# Patient Record
Sex: MALE | Race: WHITE | NOT HISPANIC OR LATINO | Employment: OTHER | URBAN - METROPOLITAN AREA
[De-identification: names, ages, dates, MRNs, and addresses within clinical notes are randomized per-mention and may not be internally consistent; named-entity substitution may affect disease eponyms.]

---

## 2020-08-06 ENCOUNTER — OFFICE VISIT CONVERTED (OUTPATIENT)
Dept: PULMONOLOGY | Facility: CLINIC | Age: 80
End: 2020-08-06
Attending: INTERNAL MEDICINE

## 2020-09-22 ENCOUNTER — OFFICE VISIT CONVERTED (OUTPATIENT)
Dept: CARDIOLOGY | Facility: CLINIC | Age: 80
End: 2020-09-22
Attending: INTERNAL MEDICINE

## 2020-09-22 ENCOUNTER — CONVERSION ENCOUNTER (OUTPATIENT)
Dept: OTHER | Facility: HOSPITAL | Age: 80
End: 2020-09-22

## 2020-10-13 ENCOUNTER — OFFICE VISIT CONVERTED (OUTPATIENT)
Dept: CARDIOLOGY | Facility: CLINIC | Age: 80
End: 2020-10-13
Attending: INTERNAL MEDICINE

## 2020-10-26 ENCOUNTER — OFFICE VISIT CONVERTED (OUTPATIENT)
Dept: PULMONOLOGY | Facility: CLINIC | Age: 80
End: 2020-10-26
Attending: INTERNAL MEDICINE

## 2021-01-19 ENCOUNTER — OFFICE VISIT CONVERTED (OUTPATIENT)
Dept: CARDIOLOGY | Facility: CLINIC | Age: 81
End: 2021-01-19
Attending: INTERNAL MEDICINE

## 2021-05-13 NOTE — PROGRESS NOTES
Progress Note      Patient Name: Rubin Ratliff   Patient ID: [id number]   Sex: Male   YOB: 1940    Primary Care Provider: Asif Chavez MD   Referring Provider: Asif Chavez MD    Visit Date: September 22, 2020    Provider: Devante Greer MD   Location: [facility]   Location Address: [address] [place] [address]  [phone]          History Of Present Illness  REFERRING CARE PROVIDER: Asif Chavez MD   Dear Asif, I saw Rubin Ratliff in the office today for a follow-up evaluation and management of severe non-ischemic cardiomyopathy, hypertension, with dual-chamber AICD. I last saw Mr. Ratliff in February 2019. He is re-establishing care in my practice. Over the last several months, he has had worsening shortness of breath. He was hospitalized once with a congestive heart failure exacerbation and subsequently recently underwent right-sided thoracentesis twice. He is compliant with his medications. Over the past few weeks, he has felt much better. He is short of breath with exertion but back to his stable baseline at this point.   PAST MEDICAL HISTORY: includes severe non-ischemic cardiomyopathy; hypertension; dual-chamber AICD device.   PSYCHOSOCIAL HISTORY: No history of mood changes or depression. He rarely drinks alcohol and never used tobacco.   CURRENT MEDICATIONS: include Advair inhaler; Albuterol inhaler; Bystolic 2.5 mg daily; calcium with Vitamin D; steroid cream; Fosinopril 5 mg b.i.d.; Lasix 40 mg p.r.n. daily for swelling; Omeprazole 20 mg daily; Hydrocodone as needed; Prolia injections; Tamsulosin 0.4 mg twice a day; Ventolin inhaler; Aldactone 25 mg daily. The dosage and frequency of the medications were reviewed with the patient.       Review of Systems  · Cardiovascular  o Admits  o : palpitations, ankle swelling occasionally, shortness of breath with exertion  o Denies  o : chest pain or angina pectoris  · Respiratory  o Admits  o :  "occasional wheezing  o Denies  o : chronic or frequent cough, asthma      Vitals  Date Time BP Position Site L\R Cuff Size HR RR TEMP (F) WT  HT  BMI kg/m2 BSA m2 O2 Sat HC       09/22/2020 11:17 /72 Sitting    79 - R   142lbs 0oz 5'  10\" 20.37 1.78           Physical Examination  · Respiratory  o Auscultation of Lungs  o : Clear to auscultation bilaterally. No crackles or rhonchi.  · Cardiovascular  o Heart  o : S1, S2 is normally heard. No S3. No murmur, rubs, or gallops.  · Gastrointestinal  o Abdominal Examination  o : Soft, nontender, nondistended. No free fluid. Bowel sounds heard in all four quadrants.  · Extremities  o Extremities  o : Warm and well perfused. No pitting pedal edema. Distal pulses present.     His recent rhythm strip from his hospitalization showed sinus rhythm with ventricular-paced rhythm.    Echocardiogram done July 22nd showed severe left ventricular systolic dysfunction, ejection fraction 20% with severe global hypokinesis, trace mitral regurgitation, mild tricuspid regurgitation, evidence of diastolic dysfunction, moderate left-sided pleural effusion.    Laboratory studies were reviewed:  Recent BUN 18, creatinine 1.1; CBC normal.    I reviewed his most recent device records from his previous cardiology group.  His device is functioning normally.  He is requiring more right ventricular pacing over time.  As of one year ago, he was pacing 51% of the time in the right ventricle.           Assessment     1.  Severe non-ischemic cardiomyopathy with chronic systolic heart failure - NYHA Class III symptomatology.       The patient was recently hospitalized over the summer for congestive heart failure and required        thoracentesis for volume overload.  He is clinically stable currently on stable medical therapy.    2.  Dual-chamber AICD - His device is functioning normally.  It has been noted over time that he is requiring        more right ventricular pacing.  This may be adversely " affecting his heart failure and symptomatology.    3.  Hypertension - Stable.  4.  COPD - Stable.         Plan     1.  I agree with his current medical therapy.  The patient will be brought back next month for an in-office        device interrogation, so we can see his pacing burden and whether we can eliminate RV pacing.  If we        cannot, given his symptomatology and degree of RV pacing, he may benefit significantly from upgrading his        device to a biventricular/CRT system.  I have discussed this with him today, and we will need an updated        device interrogation to see if that is advisable.  He is agreeable with this plan.  I will see him back in the        office next month at the time of his device interrogation.  2.  We will assume care of his remote monitoring.    It is a privilege to assist in his care.   Please let me know if you have any questions regarding his case.    Sincerely,         MATHIEU Greer M.D.  luly/chyna           This note was transcribed by Abby Rodriguez.  dmd/cbd  The above service was transcribed by Abyb Rodriguez, and I attest to the accuracy of the note.  CBD.             Electronically Signed by: Abby Rodriguez-, -Author on September 25, 2020 12:10:41 PM  Electronically Co-signed by: Devante Greer MD -Reviewer on September 25, 2020 12:40:47 PM

## 2021-05-13 NOTE — PROGRESS NOTES
Progress Note      Patient Name: Rubin Ratliff   Patient ID: [id number]   Sex: Male   YOB: 1940    Primary Care Provider: Asif Chavez MD   Referring Provider: Asif Chavez MD    Visit Date: October 13, 2020    Provider: Devante Greer MD   [facility]   Location Address: [address] [place] [address]  [phone]          Chief Complaint  · Followup from ER visit, ICD shock.      History Of Present Illness  REFERRING CARE PROVIDER: Asif Chavez MD   Rubin Ratliff is an 79 year old male who presents for followup evaluation and management of severe nonischemic cardiomyopathy, dual-chamber ICD, and recent ICD shock for ventricular tachycardia/ventricular fibrillation. Since last visit on 09/22/2020, Mr. Ratliff has been doing relatively well. He had no significant symptoms leading up to recently having an ICD shock. This occurred last Wednesday. He was out running an errand, and while driving, he suddenly felt a jolt in his chest. He did not lose consciousness. He did not have presyncope or chest pain. Review of the electrograms recorded by his device showed initial VT into the ventricular fibrillation zone and successful rescue by his ICD. He otherwise has been compliant with his medications.   PAST MEDICAL HISTORY: Dual-chamber AICD device; Hypertension; Nonischemic cardiomyopathy.   PSYCHOSOCIAL HISTORY: Current tobacco use. Denies alcohol use.   CURRENT MEDICATIONS: Advair inhaler; albuterol inhaler; calcium +D; steroid cream; fosinopril 5 mg b.i.d.; Lasix 40 mg p.r.n.; metoprolol 50 mg daily; omeprazole 20 mg daily; hydrocodone p.r.n.; Prolia injection q. 6 months; tamsulosin 0.4 mg daily; Ventolin inhaler; Aldactone 25 mg daily.       Review of Systems  · Cardiovascular  o Denies  o : palpitations (fast, fluttering, or skipping beats), swelling (feet, ankles, hands), shortness of breath while walking or lying flat, chest pain or angina  "pectoris   · Respiratory  o Denies  o : chronic or frequent cough      Vitals  Date Time BP Position Site L\R Cuff Size HR RR TEMP (F) WT  HT  BMI kg/m2 BSA m2 O2 Sat FR L/min FiO2 HC       10/13/2020 10:32 AM 86/54 Sitting    68 - R   140lbs 8oz 5'  10\" 20.16 1.77             Physical Examination  · Respiratory  o Auscultation of Lungs  o : Clear to auscultation bilaterally. No crackles or rhonchi.  · Cardiovascular  o Heart  o : S1, S2 normally heard. No S3. No murmur, rubs, or gallops.  · Gastrointestinal  o Abdominal Examination  o : Soft, nontender, nondistended. No free fluid. Bowel sounds heard in all four quadrants.  · Extremities  o Extremities  o : Warm and well perfused. No pitting pedal edema. Distal pulses present.  · Labs  o Labs  o : His laboratory studies at the emergency room showed normal chemistry and normal CBC.   · Device Interrogation  o Device Interrogation  o : I reviewed his ICD interrogation and electrograms and remote monitoring.           Assessment     1.  Recent VT/VF episode, suddenly, successfully rescued by his ICD.  2.  Severe nonischemic cardiomyopathy, currently compensated.    3.  Hypertension with initially instituting Toprol XL.  It seems to have improved clinically since his Bystolic wore        off.       Plan     1.  No driving for 3 months based on his ICD shock.  2.  Continue Toprol XL which replaced his Bystolic.  3.  Follow up in 3 months with device interrogation at that time.  The patient is agreeable with this plan.      It is a pleasure to assist in his care.  Please let me know if you have any questions regarding his case.      AMTHIEU Greer MD  CBD:vm             Electronically Signed by: Valorie Schaefer-, Other -Author on October 15, 2020 10:09:04 AM  Electronically Co-signed by: Devante Greer MD -Reviewer on October 16, 2020 07:56:41 AM  "

## 2021-05-14 VITALS
BODY MASS INDEX: 20.11 KG/M2 | DIASTOLIC BLOOD PRESSURE: 54 MMHG | WEIGHT: 140.5 LBS | HEIGHT: 70 IN | HEART RATE: 68 BPM | SYSTOLIC BLOOD PRESSURE: 86 MMHG

## 2021-05-14 VITALS
BODY MASS INDEX: 20.49 KG/M2 | SYSTOLIC BLOOD PRESSURE: 99 MMHG | DIASTOLIC BLOOD PRESSURE: 63 MMHG | WEIGHT: 143.12 LBS | HEIGHT: 70 IN | HEART RATE: 60 BPM

## 2021-05-14 VITALS
BODY MASS INDEX: 20.33 KG/M2 | SYSTOLIC BLOOD PRESSURE: 113 MMHG | HEIGHT: 70 IN | HEART RATE: 79 BPM | DIASTOLIC BLOOD PRESSURE: 72 MMHG | WEIGHT: 142 LBS

## 2021-05-14 NOTE — PROGRESS NOTES
"   Progress Note      Patient Name: Rubin Ratliff   Patient ID: [id number]   Sex: Male   YOB: 1940    Primary Care Provider: Asif Chavez MD   Referring Provider: Asif Chavez MD    Visit Date: January 19, 2021    Provider: Devante Greer MD   Location: [facility]   Location Address: [address] [place] [address]  [phone]          History Of Present Illness  REFERRING CARE PROVIDER: Asif Chavez MD   Rubin Ratliff is a 80 year old male who I saw in the office today for followup evaluation and management of severe nonischemic cardiomyopathy, chronic systolic heart failure, NYHA class II, dual-chamber AICD, and history of VT with AICD shock in October 2020. These problems have been stable since the last visit. Mr. Ratliff is doing relatively well. He is compliant with his medications. He has no further ICD shocks, no chest pain or palpitations.   PAST MEDICAL HISTORY: Dual-chamber AICD device; hypertension; nonischemic cardiomyopathy.   PSYCHOSOCIAL HISTORY: Denies tobacco and alcohol use.   CURRENT MEDICATIONS: Fosinopril sodium 10 mg half tab b.i.d.; furosemide 40 mg one to two tablets b.i.d.; metoprolol succinate ER 50 mg daily; fluticasone-salmeterol inhaled b.i.d.; omeprazole 20 mg daily. The dosage and frequency of the medications were reviewed with the patient.      ALLERGIES: No known drug allergies.       Review of Systems  · Cardiovascular  o Denies  o : palpitations (fast, fluttering, or skipping beats), swelling (feet, ankles, hands), shortness of breath while walking or lying flat, chest pain or angina pectoris   · Respiratory  o Denies  o : chronic or frequent cough      Vitals  Date Time BP Position Site L\R Cuff Size HR RR TEMP (F) WT  HT  BMI kg/m2 BSA m2 O2 Sat FR L/min FiO2 HC       01/19/2021 11:51 AM 99/63 Sitting    60 - R   143lbs 2oz 5'  10\" 20.54 1.79             Physical Examination  · Constitutional  o Appearance  o : Thin " white male, pleasant, in no acute distress.  · Respiratory  o Auscultation of Lungs  o : Clear to auscultation bilaterally. No crackles or rhonchi.  · Cardiovascular  o Heart  o : Soft basal systolic murmur.  · Gastrointestinal  o Abdominal Examination  o : Soft, nontender, nondistended. No free fluid. Bowel sounds heard in all four quadrants.  · Extremities  o Extremities  o : Warm and well perfused. No pitting pedal edema. Distal pulses present.  · Labs  o Labs  o : Laboratory studies reviewed.     Device Interrogation:  His ICD was interrogated today.  I reviewed the intracardiac tracings.  Battery life is 3-1/2 years.  Lead systems are normal.  He is 14% atrial paced and 32% ventricular paced.  He has brief nonsustained ventricular episodes but no further therapies have been delivered.      Primary care records reviewed.              Assessment     1.  Severe nonischemic cardiomyopathy with chronic systolic heart failure, NYHA class II - Clinically stable,        currently compensated.   2.  History of ventricular tachycardia in October 2020 with successful ICD rescue - He has had no further         arrhythmia episodes and that is the only rescue therapy he has received since device implant.   3.  History of dual-chamber primary prevention AICD - His device is functioning normally today.          Plan     1.  Continue his current guideline directed medical therapy.  His volume status is stable on current dose of        Lasix.   2.  Continue remote AICD monitoring.    3.  I have cleared him to drive again since he has had no further arrhythmias of significance in 3 months.   4.  Otherwise routine followup will be arranged unless problems arise in the meantime.                Electronically Signed by: Iesha Castellanos-, Other -Author on January 24, 2021 11:14:03 AM  Electronically Co-signed by: Devante Greer MD -Reviewer on February 1, 2021 09:08:06 AM

## 2021-05-28 VITALS
RESPIRATION RATE: 16 BRPM | TEMPERATURE: 98.2 F | WEIGHT: 147.12 LBS | HEIGHT: 70 IN | DIASTOLIC BLOOD PRESSURE: 60 MMHG | OXYGEN SATURATION: 97 % | HEART RATE: 94 BPM | BODY MASS INDEX: 21.06 KG/M2 | SYSTOLIC BLOOD PRESSURE: 98 MMHG

## 2021-05-28 VITALS
BODY MASS INDEX: 20.49 KG/M2 | WEIGHT: 143.12 LBS | DIASTOLIC BLOOD PRESSURE: 61 MMHG | OXYGEN SATURATION: 98 % | HEIGHT: 70 IN | HEART RATE: 69 BPM | RESPIRATION RATE: 18 BRPM | SYSTOLIC BLOOD PRESSURE: 100 MMHG | TEMPERATURE: 98.3 F

## 2021-05-28 NOTE — PROGRESS NOTES
Patient: ANIYA JACKSON     Acct: [id number]     Report: #[id number]  UNIT #: [id number]     : 1940    Encounter Date:2020  PRIMARY CARE: ZACHARY KRAUSE  ***Signed***  --------------------------------------------------------------------------------------------------------------------  Chief Complaint      Encounter Date      Aug 6, 2020            Primary Care Provider      ZACHARY KRAUSE            Referring Provider      ZACHARY KRAUSE            Patient Complaint      Patient is complaining of      new patient/pleura effusion            VITALS      Height 5 ft 10 in / 177.8 cm      Weight 147 lbs 2 oz / 66.108386 kg      BSA 1.83 m2      BMI 21.1 kg/m2      Temperature 98.2 F / 36.78 C - Oral      Pulse 94      Respirations 16      Blood Pressure 98/60 Sitting, Right Arm      Pulse Oximetry 97%, room air            HPI      The patient is an 79 year old  male with known cardiomyopathy and     systolic heart failure with EF of 20% here for evaluation for pleural effusion.     The patient lives in [place] and is a patient at [facility]. The patient was at     [facility] for worsening shortness of breath and had large right pleural     effusion with two liters drained.  A week later, he had another liter and a half    drained.  I do have that analysis and it is all transudative and lymphocyte     predominant fluid with negative cytology.  The patient was also grossly volume     overloaded and was diuresed aggressively during his hospitalization. The patient    is now on Lasix and spironolactone and had his other cardiac medications     adjusted.  He states that his last tap was one week ago  where about one liter     was removed from his chest.  He feels much better and feels like he has not had     recurrent symptoms as previously.  He denies any dyspnea, cough, wheeze,     headaches, chest pain or hemoptysis. He has intermittent leg swelling, but     denies any orthopnea or paroxysmal  nocturnal dyspnea.  He is weighing himself     everyday and is maintaining euvolemia.  He does state that if he gets up to 145     pounds he takes an extra dose of his Lasix.  He does not feel he needs to have     his fluid drained at this time.  He is a never smoker, but does report a history    of COPD.  It also sounds like he more than likely has asthma. he takes Advair     and does not use rescue inhaler.  He is able to perform ADLs without difficulty.     Denies any swollen glands or lymph nodes of the head and neck.            I have personally reviewed the review of systems, past family, social, surgical     and medical histories and I agree with the findings.            ROS      Constitutional:  Complains of: Fatigue; Denies: Fever, Weight gain, Weight loss,    Chills, Insomnia, Other      Respiratory/Breathing:  Complains of: Shortness of air, Wheezing, Cough; Denies:    Hemoptysis, Pleuritic pain, Other      Endocrine:  Denies: Polydipsia, Polyuria, Heat/cold intolerance, Diabetes, Other      Eyes:  Denies: Blurred vision, Vision Changes, Other      Ears, nose, mouth, throat:  Denies: Mouth lesions, Thrush, Throat pain,     Hoarseness, Allergies/Hay Fever, Post Nasal Drip, Headaches, Recent Head Injury,    Nose Bleeding, Neck Stiffness, Thyroid Mass, Hearing Loss, Ear Fullness, Dry     Mouth, Nasal or Sinus Pain, Dry Lips, Nasal discharge, Nasal congestion, Other      Cardiovascular:  Denies: Palpitations, Syncope, Claudication, Chest Pain, Wake     up Gasping for air, Leg Swelling, Irregular Heart Rate, Cyanosis, Dyspnea on     Exertion, Other      Gastrointestinal:  Denies: Nausea, Constipation, Diarrhea, Abdominal pain,     Vomiting, Difficulty Swallowing, Reflux/Heartburn, Dysphagia, Jaundice, Bloatin    g, Melena, Bloody stools, Other      Genitourinary:  Denies: Urinary frequency, Incontinence, Hematuria, Urgency,     Nocturia, Dysuria, Testicular problems, Other      Musculoskeletal:  Denies: Joint  Pain, Joint Stiffness, Joint Swelling, Myalgias,    Other      Hematologic/lymphatic:  DENIES: Lymphadenopathy, Bruising, Bleeding tendencies,     Other      Neurological:  Denies: Headache, Numbness, Weakness, Seizures, Other      Psychiatric:  Denies: Anxiety, Appropriate Effect, Depression, Other      Sleep:  No: Excessive daytime sleep, Morning Headache?, Snoring, Insomnia?, Stop    breathing at sleep?, Other      Integumentary:  Denies: Rash, Dry skin, Skin Warm to Touch, Other      Immunologic/Allergic:  Denies: Latex allergy, Seasonal allergies, Asthma,     Urticaria, Eczema, Other      Immunization status:  No: Up to date            FAMILY/SOCIAL/MEDICAL HX      Surgical History:  Yes: Back Surgery, Carotid Stenosis, Hernia Surgery,     Orthopedic Surgery (back), Vascular Surgery (pacemaker ); No: AAA Repair,     Abdominal Surgery, Adenoids, Angioplasty, Appendectomy, Bladder Surgery, Bowel     Surgery, Breast Surgery, CABG, Cholecystectomy, Ear Surgery, Eye Surgery, Head     Surgery, Kidney Surgery, Nose Surgery, Oral Surgery, Prostatectomy, Rectal Surge    ry, Spinal Surgery, Testicular Surgery, Throat Surgery, Tonsils, Valve     Replacement, Other Surgeries      Heart - Family Hx:  Father, Sister      Cancer/Type - Family Hx:  Mother, Brother      Is Father Still Living?:  No      Is Mother Still Living?:  No       Family History:  Yes      Social History:  No Tobacco Use, No Alcohol Use, No Recreational Drug use      Smoking status:  Never smoker      Anticoagulation Therapy:  No      Antibiotic Prophylaxis:  No      Medical History:  Yes: Arthritis, Asthma, Chronic Bronchitis/COPD, Miscellaneous    Medical/oth (heart failure ); No: Alcoholism, Allergies, Anemia, Atrial     Fibrillation, Blood Disease, Broken Bones, Cataracts, Chemical Dependency,     Chemotherapy/Cancer, Emphysema, Chronic Liver Disease, Colon Trouble, Colitis,     Diverticulitis, Congestive Heart Failu, Deafness or Ringing Ears,  Convulsions,     Depression, Anxiety, Bipolar Disorder, PTSD, Diabetes, Epilepsy, Seizures,     Forgetfullness, Glaucoma, Gall Stones, Gout, Head Injury, Heart Attack, Heart     Murmur, GERD, Hemorrhoids/Rectal Prob, Hepatitis, Hiatal Hernia, High Blood     Pressure, High Cholesterol, HIV (Do not ask - volu, Jaundice, Kidney or Bladder     Disease, Kidney Stones, Migrane Headaches, Mitral Valve Prolapse, Night sweats,     Phlebitis, Psychiatric Care, Reflux Disease, Rheumatic Fever, Sexually     Transmitted Dis, Shortness Of Breath, Sinus Trouble, Skin Disease/Psoriais/Ecz,     Stroke, Thyroid Problem, Tuberculosis or Pos TB Te      Psychiatric History      none            PREVENTION      Hx Influenza Vaccination:  Yes      Date Influenza Vaccine Given:  Nov 1, 2019      Influenza Vaccine Declined:  No      2 or More Falls in Past Year?:  No      Fall Past Year with Injury?:  No      Hx Pneumococcal Vaccination:  Yes      Encouraged to follow-up with:  PCP regarding preventative exams.      Chart initiated by      karma cowart ma            ALLERGIES/MEDICATIONS      Allergies:        Coded Allergies:             Boniva (Verified  Allergy, 8/6/20)           Fosamax (Verified  Allergy, 8/6/20)           Levofloxacin (Verified  Allergy, 8/6/20)           Penicillins (Verified  Allergy, 8/6/20)      Medications    Last Reconciled on 8/7/20 08:27 by SOLOMON ORO MD      Tamsulosin HCL (Tamsulosin HCL) 0.4 Mg Capsule      0.4 MG PO QDAY, #30 CAP 0 Refills         Reported         8/6/20       Spironolactone (Spironolactone*) 25 Mg Tablet      25 MG PO QDAY, #30 TAB 0 Refills         Reported         8/6/20       Omeprazole (Omeprazole*) 20 Mg Tablet.      20 MG PO QDAY, #30 CAP 0 Refills         Reported         8/6/20       Nebivolol HCl (BYSTOLIC) 2.5 Mg Tablet      2.5 MG PO QDAY, #30 TAB         Reported         8/6/20       Furosemide* (Lasix*) 40 Mg Tablet      40 MG PO QDAY, #30 TAB 0 Refills          Reported         8/6/20       Fosinopril Sodium (FOSINOPRIL SODIUM) 10 Mg Tablet      5 MG PO HS, #30 TAB 0 Refills         Reported         8/6/20       Fluticasone/Salmeterol 230/21 (Advair /21 MCG) 12 Gm Hfa.aer.ad      2 PUFF INH RTBID, INH         Reported         8/6/20       Denosumab (Prolia) 60 Mg/1 Ml Syringe      60 MG SUBQ Q180D, #1 SYRINGE         Reported         8/6/20       Cholecalciferol (Vitamin D3*) 2,000 U Tablet      2000 UNITS PO QDAY, #30 TAB 0 Refills         Reported         8/6/20       NEB-Albuterol Sulf (Albuterol Sulfate) 5 Mg/1 Ml Solution      2.5 MG INH RTTID, #2 BOTTLE 0 Refills         Reported         8/6/20       MDI-Albuterol (Proair HFA) 8.5 Gm Hfa.aer.ad      2 PUFFS INH RTTID, #1 MDI 0 Refills         Reported         8/6/20      Current Medications      Current Medications Reviewed 8/6/20            EXAM      Vital Signs Reviewed.      General:  Elderly male in no apparent distress.        HEENT: PERRL, EOMI.  OP, nares clear, no sinus tenderness.      Neck: Supple, no JVD, no thyromegaly.      Lymph: No axillary, cervical, supraclavicular lymphadenopathy noted bilaterally.      Chest: Good aeration, diminished right base, but clear to auscultation     bilaterally, dull to percussion right base, no work of breathing noted.      CV: RRR, systolic ejection murmur appreciated.        Abd: Soft, NT, ND, +BS, no HSM.      EXT: No clubbing, no cyanosis, no edema, no joint tenderness.        Neuro:  A  Skin: No rashes or lesions.      Vtials      Vitals:             Height 5 ft 10 in / 177.8 cm           Weight 147 lbs 2 oz / 66.198616 kg           BSA 1.83 m2           BMI 21.1 kg/m2           Temperature 98.2 F / 36.78 C - Oral           Pulse 94           Respirations 16           Blood Pressure 98/60 Sitting, Right Arm           Pulse Oximetry 97%, room air            REVIEW      Results Reviewed      PCCS Results Reviewed?:  Yes Prev Lab Results, Yes Prev Radiology  Results, Yes     [facility]      Lab Results      I reviewed office notes from referring provider. I reviewed medical records from    [facility] including multiple chest x-rays showing large right greater     than left bilateral pleural effusions.  I reviewed a thoracentesis note from     radiology where they drained 2 liters followed 1 liter.  Pleural fluid analysis     yielded a transudative fluid that was lymphocyte predominant with negative     cultures and cytology.            Assessment      Pleural effusion - J90            Notes      New Medications      * MDI-Albuterol (Proair HFA) 8.5 GM HFA.AER.AD: 2 PUFFS INH RTTID #1      * NEB-Albuterol Sulf (Albuterol Sulfate) 5 MG/1 ML SOLUTION: 2.5 MG INH RTTID #2         Instructions: DIAGNOSIS CODE REQUIRED PRIOR TO PRESCRIBING.      * Cholecalciferol (Vitamin D3*) 2,000 U TABLET: 2,000 UNITS PO QDAY #30      * DENOSUMAB (Prolia) 60 MG/1 ML SYRINGE: 60 MG SUBQ Q180D #1         Instructions: Give every 6 months.      * Fluticasone/Salmeterol 230/21 (Advair /21 MCG) 12 GM HFA.AER.AD: 2 PUFF      INH RTBID      * FOSINOPRIL SODIUM 10 MG TABLET: 5 MG PO HS #30      * Furosemide* (Lasix*) 40 MG TABLET: 40 MG PO QDAY #30      * Nebivolol HCl (BYSTOLIC) 2.5 MG TABLET: 2.5 MG PO QDAY #30      * Omeprazole (Omeprazole*) 20 MG TABLET.DR: 20 MG PO QDAY #30      * Spironolactone (Spironolactone*) 25 MG TABLET: 25 MG PO QDAY #30      * Tamsulosin HCL 0.4 MG CAPSULE: 0.4 MG PO QDAY #30      New Diagnostics      * Chest 2 View, 3 Months         Dx: Pleural effusion - J90      IMPRESSION:      1.  Right greater than left bilateral pleural effusions. Transudative secondary     to congestive heart failure.      2. Chronic compensated systolic congestive heart failure with EF 20A%.      3. Intermittent leg edema.      4. Dyspnea, resolved.      5. Orthopnea, resolved.      6. Never smoker.      7. Asthma without exacerbation.  Asthma control test score  is 26, doing great     with Advair with no symptoms.               PLAN:      1.  Discussed with the patient that his pleural effusions were secondary to     congestive heart failure and no other occult pathology or anything ominous is     causing these effusions. The patient appears to be euvolemic and needs to     continue all of his diuretics and cardiac medications at this time as     prescribed.      2. Continue checking daily weights and maintain a fluid and salt restricted     diet.      3.  On exam he does have a small pleural effusion which I will send him for an     x-ray at his next follow up visit. I informed him he will likely always carry a     little bit of fluid on him given his age and EF.  I would not consider draining     this unless he has worsening symptoms. I discussed with the patient if at any     point he has worsening dyspnea, orthopnea or PND, he is notify myself or his PCP    and he can have a thoracentesis done at [facility] or he can travel to     [facility] and I will be more than happy to perform a thoracentesis here.      4. Up-to-date with flu, Prevnar and Pneumovax.      5.  Continue Advair with albuterol as needed.      6. Follow up with me in three months.            Patient Education            Patient Education:        Pleural Effusion      Thoracentesis            Electronically signed by SOLOMON ORO  08/10/2020 08:01       Disclaimer: Converted document may not contain table formatting or lab diagrams. Please see [facility] for the authenticated document.
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
## 2021-07-20 ENCOUNTER — OFFICE VISIT (OUTPATIENT)
Dept: CARDIOLOGY | Facility: CLINIC | Age: 81
End: 2021-07-20

## 2021-07-20 VITALS
SYSTOLIC BLOOD PRESSURE: 96 MMHG | HEART RATE: 60 BPM | BODY MASS INDEX: 21.18 KG/M2 | DIASTOLIC BLOOD PRESSURE: 64 MMHG | HEIGHT: 69 IN | WEIGHT: 143 LBS

## 2021-07-20 DIAGNOSIS — I50.22 CHRONIC SYSTOLIC CONGESTIVE HEART FAILURE (HCC): ICD-10-CM

## 2021-07-20 DIAGNOSIS — I42.0 DILATED CARDIOMYOPATHY (HCC): Primary | ICD-10-CM

## 2021-07-20 DIAGNOSIS — Z95.810 ICD (IMPLANTABLE CARDIOVERTER-DEFIBRILLATOR), DUAL, IN SITU: ICD-10-CM

## 2021-07-20 DIAGNOSIS — I47.20 VT (VENTRICULAR TACHYCARDIA) (HCC): ICD-10-CM

## 2021-07-20 PROCEDURE — 99214 OFFICE O/P EST MOD 30 MIN: CPT | Performed by: INTERNAL MEDICINE

## 2021-07-20 RX ORDER — FOSINOPRIL SODIUM 10 MG/1
10 TABLET ORAL DAILY
COMMUNITY

## 2021-07-20 RX ORDER — OMEPRAZOLE 20 MG/1
20 CAPSULE, DELAYED RELEASE ORAL DAILY
COMMUNITY

## 2021-07-20 RX ORDER — PHENOL 1.4 %
600 AEROSOL, SPRAY (ML) MUCOUS MEMBRANE DAILY
COMMUNITY

## 2021-07-20 RX ORDER — TAMSULOSIN HYDROCHLORIDE 0.4 MG/1
0.4 CAPSULE ORAL DAILY
COMMUNITY

## 2021-07-20 RX ORDER — ALBUTEROL SULFATE 90 UG/1
2 AEROSOL, METERED RESPIRATORY (INHALATION)
COMMUNITY

## 2021-07-20 RX ORDER — FUROSEMIDE 40 MG/1
40 TABLET ORAL DAILY
COMMUNITY
Start: 2021-06-09

## 2021-07-20 RX ORDER — METOPROLOL SUCCINATE 50 MG/1
50 TABLET, EXTENDED RELEASE ORAL DAILY
COMMUNITY
Start: 2021-07-03

## 2021-07-20 RX ORDER — SPIRONOLACTONE 25 MG/1
25 TABLET ORAL DAILY
COMMUNITY
Start: 2021-07-04

## 2021-07-20 NOTE — PROGRESS NOTES
Chief Complaint  Follow-up (no complaints)    Subjective            Rubin Ratliff presents to [facility] CARDIOLOGY  History of Present Illness    Rubin is here for follow-up evaluation and management of nonischemic cardiomyopathy, NYHA class II chronic systolic heart failure, history of VT with AICD shock October 2020 and dual-chamber AICD.  Since last visit Mr. Ratliff is doing relatively well.  He stays relatively active.  He denies excessive shortness of breath or chest pain.  No AICD shocks or palpitations.    PMH  Past Medical History:   Diagnosis Date   • Hypertension          SURGICALHX  Past Surgical History:   Procedure Laterality Date   • CARDIAC CATHETERIZATION     • INSERT / REPLACE / REMOVE PACEMAKER          SOC  Social History     Socioeconomic History   • Marital status:      Spouse name: Not on file   • Number of children: Not on file   • Years of education: Not on file   • Highest education level: Not on file   Tobacco Use   • Smoking status: Never Smoker   • Smokeless tobacco: Never Used   Vaping Use   • Vaping Use: Never used   Substance and Sexual Activity   • Alcohol use: Never   • Drug use: Never   • Sexual activity: Defer         FAMHX  Family History   Problem Relation Age of Onset   • Heart disease Father    • Heart failure Father           ALLERGY  Allergies   Allergen Reactions   • Penicillins Hives        MEDSCURRENT    Current Outpatient Medications:   •  albuterol sulfate  (90 Base) MCG/ACT inhaler, Inhale 2 puffs., Disp: , Rfl:   •  calcium carbonate (OS-FACUNDO) 600 MG tablet, Take 600 mg by mouth Daily., Disp: , Rfl:   •  denosumab (PROLIA) 60 MG/ML solution prefilled syringe syringe, Inject 60 mg under the skin into the appropriate area as directed., Disp: , Rfl:   •  fluticasone-salmeterol (Advair Diskus) 100-50 MCG/DOSE DISKUS, Inhale 2 puffs Daily., Disp: , Rfl:   •  fosinopril (MONOPRIL) 10 MG tablet, Take 10 mg by mouth Daily., Disp: , Rfl:  "  •  furosemide (LASIX) 40 MG tablet, Take 40 mg by mouth Daily., Disp: , Rfl:   •  metoprolol succinate XL (TOPROL-XL) 50 MG 24 hr tablet, Take 50 mg by mouth Daily., Disp: , Rfl:   •  omeprazole (priLOSEC) 20 MG capsule, Take 20 mg by mouth Daily., Disp: , Rfl:   •  spironolactone (ALDACTONE) 25 MG tablet, Take 25 mg by mouth Daily., Disp: , Rfl:   •  tamsulosin (FLOMAX) 0.4 MG capsule 24 hr capsule, Take 0.4 mg by mouth Daily., Disp: , Rfl:       Review of Systems   Constitutional: Negative.   Cardiovascular: Negative.    Respiratory: Negative.         Objective     BP 96/64 (Patient Position: Sitting)   Pulse 60   Ht 175.3 cm (69\")   Wt 64.9 kg (143 lb)   BMI 21.12 kg/m²       General Appearance:   · well developed  · well nourished  HENT:   · oropharynx moist  · lips not cyanotic  Neck:  · thyroid not enlarged  · supple  Respiratory:  · no respiratory distress  · normal breath sounds  · no rales  Cardiovascular:  · no jugular venous distention  · regular rhythm  · apical impulse normal  · S1 normal, S2 normal  · no S3, no S4   · no murmur  · no rub, no thrill  · carotid pulses normal; no bruit  · pedal pulses normal  · lower extremity edema: none    Musculoskeletal:  · no clubbing of fingers.   · normocephalic, head atraumatic  Skin:   · warm, dry  Psychiatric:  · judgement and insight appropriate  · normal mood and affect      Result Review :             Data reviewed: Primary care records reviewed by me     Procedures     Dual-chamber ICD remote monitoring data reviewed, as of January normal device function battery life 3-1/2 years, 32% ventricular pacing, no significant arrhythmias               Assessment and Plan        ASSESSMENT:  Encounter Diagnoses   Name Primary?   • Dilated cardiomyopathy (CMS/HCC) Yes   • Chronic systolic congestive heart failure (CMS/HCC)    • ICD (implantable cardioverter-defibrillator), dual, in situ    • VT (ventricular tachycardia) (CMS/HCC)          PLAN:    1Jael Conklin is " clinically stable, no recurrent significant VT.  He is on appropriate medical therapy which is limited to some degree by his chronic relatively low blood pressure.  2.  AICD is functioning normally, he has had ventricular pacing to a moderate degree over the years, but clinically is stable.  If he has worsening heart failure and significantly higher ventricular pacing burden upgrade to a CRT device can be considered in the future which we have discussed  3.  Otherwise follow-up in 6 months with a device check as well          Patient was given instructions and counseling regarding his condition or for health maintenance advice. Please see specific information pulled into the AVS if appropriate.             JAMES Greer MD  7/20/2021    11:01 EDT

## 2022-02-15 ENCOUNTER — OFFICE VISIT (OUTPATIENT)
Dept: CARDIOLOGY | Facility: CLINIC | Age: 82
End: 2022-02-15

## 2022-02-15 ENCOUNTER — CLINICAL SUPPORT NO REQUIREMENTS (OUTPATIENT)
Dept: CARDIOLOGY | Facility: CLINIC | Age: 82
End: 2022-02-15

## 2022-02-15 VITALS
DIASTOLIC BLOOD PRESSURE: 69 MMHG | BODY MASS INDEX: 21.19 KG/M2 | WEIGHT: 148 LBS | HEIGHT: 70 IN | HEART RATE: 76 BPM | SYSTOLIC BLOOD PRESSURE: 104 MMHG

## 2022-02-15 DIAGNOSIS — I42.0 DILATED CARDIOMYOPATHY: Primary | ICD-10-CM

## 2022-02-15 DIAGNOSIS — Z95.810 ICD (IMPLANTABLE CARDIOVERTER-DEFIBRILLATOR), DUAL, IN SITU: ICD-10-CM

## 2022-02-15 DIAGNOSIS — I47.20 VT (VENTRICULAR TACHYCARDIA): ICD-10-CM

## 2022-02-15 DIAGNOSIS — I50.22 CHRONIC SYSTOLIC CONGESTIVE HEART FAILURE: ICD-10-CM

## 2022-02-15 PROCEDURE — 93283 PRGRMG EVAL IMPLANTABLE DFB: CPT | Performed by: INTERNAL MEDICINE

## 2022-02-15 PROCEDURE — 99214 OFFICE O/P EST MOD 30 MIN: CPT | Performed by: INTERNAL MEDICINE

## 2022-02-15 RX ORDER — METOLAZONE 2.5 MG/1
TABLET ORAL AS NEEDED
COMMUNITY
Start: 2022-01-31

## 2022-02-15 NOTE — PROGRESS NOTES
Normal Dual ICD Device Interrogation and Device Testing.  Normal evaluation of device function and lead measurements.  No optimization was needed of parameters or maximization of device longevity.  Patient is on automated Home Remote Monitoring.  Remaining battery is 2.5 years, he has had 7 NS-VT short episodes.

## 2022-02-15 NOTE — PROGRESS NOTES
Chief Complaint  Congestive Heart Failure, Cardiomyopathy, and Presence of Pacemaker    Subjective            Rubin Ratliff presents to [facility] CARDIOLOGY  Congestive Heart Failure  Pertinent negatives include no chest pain or palpitations.   Cardiomyopathy  Pertinent negatives include no chest pain.       Rubin is here for follow-up evaluation and management of nonischemic cardiomyopathy, NYHA class II chronic systolic heart failure, history of VT with AICD shock October 2020 and dual-chamber AICD.  Since last visit Mr. Ratliff is doing relatively well. He denies excessive shortness of breath. He has been monitoring his daily weight which has been stable, only once over the past couple of months has he needed to take a as needed metolazone dose for a 1 pound weight gain. He denies significant chest pain. No palpitations or subjective tachycardia.    PMH  Past Medical History:   Diagnosis Date   • CHF (congestive heart failure) (HCC)    • Hypertension          SURGICALHX  Past Surgical History:   Procedure Laterality Date   • CARDIAC CATHETERIZATION     • INSERT / REPLACE / REMOVE PACEMAKER          SOC  Social History     Socioeconomic History   • Marital status:    Tobacco Use   • Smoking status: Never Smoker   • Smokeless tobacco: Never Used   Vaping Use   • Vaping Use: Never used   Substance and Sexual Activity   • Alcohol use: Never   • Drug use: Never   • Sexual activity: Defer         FAMHX  Family History   Problem Relation Age of Onset   • Heart disease Father    • Heart failure Father           ALLERGY  Allergies   Allergen Reactions   • Penicillins Hives        MEDSCURRENT    Current Outpatient Medications:   •  albuterol sulfate  (90 Base) MCG/ACT inhaler, Inhale 2 puffs., Disp: , Rfl:   •  calcium carbonate (OS-FACUNDO) 600 MG tablet, Take 600 mg by mouth Daily., Disp: , Rfl:   •  denosumab (PROLIA) 60 MG/ML solution prefilled syringe syringe, Inject 60 mg under the  "skin into the appropriate area as directed. Every 6 months, Disp: , Rfl:   •  fluticasone-salmeterol (Advair Diskus) 100-50 MCG/DOSE DISKUS, Inhale 2 puffs Daily., Disp: , Rfl:   •  fosinopril (MONOPRIL) 10 MG tablet, Take 10 mg by mouth Daily., Disp: , Rfl:   •  furosemide (LASIX) 40 MG tablet, Take 40 mg by mouth Daily., Disp: , Rfl:   •  metOLazone (ZAROXOLYN) 2.5 MG tablet, As Needed., Disp: , Rfl:   •  metoprolol succinate XL (TOPROL-XL) 50 MG 24 hr tablet, Take 50 mg by mouth Daily., Disp: , Rfl:   •  omeprazole (priLOSEC) 20 MG capsule, Take 20 mg by mouth Daily., Disp: , Rfl:   •  spironolactone (ALDACTONE) 25 MG tablet, Take 25 mg by mouth Daily., Disp: , Rfl:   •  tamsulosin (FLOMAX) 0.4 MG capsule 24 hr capsule, Take 0.4 mg by mouth Daily., Disp: , Rfl:       Review of Systems   Constitutional: Negative.   Cardiovascular: Positive for dyspnea on exertion. Negative for chest pain, irregular heartbeat and palpitations.   Respiratory: Negative.         Objective     /69   Pulse 76   Ht 177.8 cm (70\")   Wt 67.1 kg (148 lb)   BMI 21.24 kg/m²       General Appearance:   · well developed  · well nourished  HENT:   · oropharynx moist  · lips not cyanotic  Neck:  · thyroid not enlarged  · supple  Respiratory:  · no respiratory distress  · normal breath sounds  · no rales  Cardiovascular:  · no jugular venous distention  · regular rhythm  · apical impulse normal  · S1 normal, S2 normal  · no S3, no S4   · no murmur  · no rub, no thrill  · carotid pulses normal; no bruit  · pedal pulses normal  · lower extremity edema: none    Musculoskeletal:  · no clubbing of fingers.   · normocephalic, head atraumatic  Skin:   · warm, dry  Psychiatric:  · judgement and insight appropriate  · normal mood and affect      Result Review :             Data reviewed: Primary care records reviewed, pacemaker/ICD remote monitoring reviewed. Laboratory studies reviewed, recent CBC normal, creatinine 1.5     Procedures     ICD " interrogated today, normal device function, 19% atrial pacing, 35% ventricular pacing, no significant sustained arrhythmias, battery life 2-1/2 years               Assessment and Plan        ASSESSMENT:  Encounter Diagnoses   Name Primary?   • Dilated cardiomyopathy (HCC) Yes   • Chronic systolic congestive heart failure (HCC)    • VT (ventricular tachycardia) (HCC)    • ICD (implantable cardioverter-defibrillator), dual, in situ          PLAN:    1. Dilated cardiomyopathy/chronic systolic heart failure clinically stable, continue current guideline directed medical therapy. Diuretic regimen appears appropriate.  2.  AICD is functioning normally, he has had ventricular pacing to a moderate degree over the years, which has been stable, but clinically is stable.  If he has worsening heart failure and significantly higher ventricular pacing burden upgrade to a CRT device can be considered in the future which we have discussed  3. No significant recurrent VT, continue current medical regimen    Follow-up in about 6 months          Patient was given instructions and counseling regarding his condition or for health maintenance advice. Please see specific information pulled into the AVS if appropriate.             JAMES Greer MD  2/15/2022    12:05 EST

## 2022-05-05 ENCOUNTER — TELEPHONE (OUTPATIENT)
Dept: CARDIOLOGY | Facility: CLINIC | Age: 82
End: 2022-05-05

## 2022-05-05 NOTE — TELEPHONE ENCOUNTER
You will need to contact the [employer] rep and see if they will replace it. [employer] can assist    CBD

## 2022-05-05 NOTE — TELEPHONE ENCOUNTER
Let Alice pts daughter know that [employer] will be sending out a blood pressure monitor out in the mail to pts home. Per [employer]

## 2022-05-05 NOTE — TELEPHONE ENCOUNTER
PTS DAUGHTER CALLED REQUESTING A NEW BP MACHINE, STATES HE HAS BEEN HAVING TROUBLE OUT OF HIS FOR SEVERAL MONTHS ALMOST LIKE THE BATTERIES WONT GO IN THE MACHINE WELL ANYMORE. PLEASE ADVISE. -PTS DAUGHTER CALLED BACK LATER AND STATED [employer] IS THE COMPANY THAT SENT ALL OF THE EQUIPMENT AND THE BP MACHINE SAYS (AND) ON IT.

## 2022-08-16 ENCOUNTER — OFFICE VISIT (OUTPATIENT)
Dept: CARDIOLOGY | Facility: CLINIC | Age: 82
End: 2022-08-16

## 2022-08-16 VITALS
HEART RATE: 76 BPM | SYSTOLIC BLOOD PRESSURE: 106 MMHG | HEIGHT: 70 IN | WEIGHT: 145.2 LBS | DIASTOLIC BLOOD PRESSURE: 74 MMHG | BODY MASS INDEX: 20.79 KG/M2

## 2022-08-16 DIAGNOSIS — I42.0 DILATED CARDIOMYOPATHY: Primary | ICD-10-CM

## 2022-08-16 DIAGNOSIS — I50.22 CHRONIC SYSTOLIC CONGESTIVE HEART FAILURE: ICD-10-CM

## 2022-08-16 DIAGNOSIS — I47.20 VT (VENTRICULAR TACHYCARDIA): ICD-10-CM

## 2022-08-16 DIAGNOSIS — Z95.810 ICD (IMPLANTABLE CARDIOVERTER-DEFIBRILLATOR), DUAL, IN SITU: ICD-10-CM

## 2022-08-16 PROCEDURE — 99214 OFFICE O/P EST MOD 30 MIN: CPT | Performed by: INTERNAL MEDICINE

## 2022-08-16 NOTE — PROGRESS NOTES
Chief Complaint  Cardiomyopathy    Subjective            Rubin Ratliff presents to [facility] CARDIOLOGY  History of Present Illness    Mr. Ratliff is here for follow-up evaluation management of dilated cardiomyopathy with chronic systolic heart failure, ventricular tachycardia, and dual-chamber ICD.  Since last visit he is doing relatively well.  He has had some right hip pain but otherwise denies chest pain palpitations or excessive shortness of breath.  He is compliant with his medications.    PMH  Past Medical History:   Diagnosis Date   • CHF (congestive heart failure) (HCC)    • Hypertension          SURGICALHX  Past Surgical History:   Procedure Laterality Date   • CARDIAC CATHETERIZATION     • INSERT / REPLACE / REMOVE PACEMAKER          SOC  Social History     Socioeconomic History   • Marital status:    Tobacco Use   • Smoking status: Never Smoker   • Smokeless tobacco: Never Used   Vaping Use   • Vaping Use: Never used   Substance and Sexual Activity   • Alcohol use: Never   • Drug use: Never   • Sexual activity: Defer         FAMHX  Family History   Problem Relation Age of Onset   • Heart disease Father    • Heart failure Father           ALLERGY  Allergies   Allergen Reactions   • Penicillins Hives        MEDSCURRENT    Current Outpatient Medications:   •  albuterol sulfate  (90 Base) MCG/ACT inhaler, Inhale 2 puffs., Disp: , Rfl:   •  calcium carbonate (OS-FACUNDO) 600 MG tablet, Take 600 mg by mouth Daily., Disp: , Rfl:   •  denosumab (PROLIA) 60 MG/ML solution prefilled syringe syringe, Inject 60 mg under the skin into the appropriate area as directed. Every 6 months, Disp: , Rfl:   •  fluticasone-salmeterol (ADVAIR) 100-50 MCG/DOSE DISKUS, Inhale 2 puffs Daily., Disp: , Rfl:   •  fosinopril (MONOPRIL) 10 MG tablet, Take 10 mg by mouth Daily., Disp: , Rfl:   •  furosemide (LASIX) 40 MG tablet, Take 40 mg by mouth Daily., Disp: , Rfl:   •  metOLazone (ZAROXOLYN) 2.5 MG  "tablet, As Needed., Disp: , Rfl:   •  metoprolol succinate XL (TOPROL-XL) 50 MG 24 hr tablet, Take 50 mg by mouth Daily., Disp: , Rfl:   •  omeprazole (priLOSEC) 20 MG capsule, Take 20 mg by mouth Daily., Disp: , Rfl:   •  spironolactone (ALDACTONE) 25 MG tablet, Take 25 mg by mouth Daily., Disp: , Rfl:   •  tamsulosin (FLOMAX) 0.4 MG capsule 24 hr capsule, Take 0.4 mg by mouth Daily., Disp: , Rfl:       Review of Systems   Cardiovascular: Negative for chest pain, dyspnea on exertion and palpitations.   Musculoskeletal: Positive for joint pain.        Objective     /74   Pulse 76   Ht 177.8 cm (70\")   Wt 65.9 kg (145 lb 3.2 oz)   BMI 20.83 kg/m²       General Appearance:   · well developed  · well nourished  HENT:   · oropharynx moist  · lips not cyanotic  Neck:  · thyroid not enlarged  · supple  Respiratory:  · no respiratory distress  · normal breath sounds  · no rales  Cardiovascular:  · no jugular venous distention  · regular rhythm  · apical impulse normal  · S1 normal, S2 normal  · no S3, no S4   · no murmur  · no rub, no thrill  · carotid pulses normal; no bruit  · pedal pulses normal  · lower extremity edema: none    Musculoskeletal:  · no clubbing of fingers.   · normocephalic, head atraumatic  Skin:   · warm, dry  Psychiatric:  · judgement and insight appropriate  · normal mood and affect      Result Review :             Data reviewed: Primary care records reviewed, laboratory studies reviewed, remote ICD monitoring reviewed.  14% right atrial pacing, 49% right ventricular pacing, adequate battery life     Procedures               Assessment and Plan        ASSESSMENT:  Encounter Diagnoses   Name Primary?   • Dilated cardiomyopathy (HCC) Yes   • Chronic systolic congestive heart failure (HCC)    • VT (ventricular tachycardia) (HCC)    • ICD (implantable cardioverter-defibrillator), dual, in situ          PLAN:    1.  Dilated cardiomyopathy with chronic systolic congestive heart failure, " clinically stable, continue current medical therapy.  2.  Ventricular tachycardia, stable, no significant recurrence on remote ICD monitoring.  3.  Dual-chamber ICD, normal device function with adequate battery life, continue remote monitoring    Follow-up in 6 months          Patient was given instructions and counseling regarding his condition or for health maintenance advice. Please see specific information pulled into the AVS if appropriate.             JAMES Greer MD  8/16/2022    11:40 EDT

## 2023-02-21 ENCOUNTER — OFFICE VISIT (OUTPATIENT)
Dept: CARDIOLOGY | Facility: CLINIC | Age: 83
End: 2023-02-21
Payer: MEDICARE

## 2023-02-21 ENCOUNTER — CLINICAL SUPPORT NO REQUIREMENTS (OUTPATIENT)
Dept: CARDIOLOGY | Facility: CLINIC | Age: 83
End: 2023-02-21
Payer: MEDICARE

## 2023-02-21 VITALS
HEIGHT: 70 IN | HEART RATE: 67 BPM | SYSTOLIC BLOOD PRESSURE: 110 MMHG | BODY MASS INDEX: 20.76 KG/M2 | WEIGHT: 145 LBS | DIASTOLIC BLOOD PRESSURE: 71 MMHG

## 2023-02-21 DIAGNOSIS — I50.22 CHRONIC SYSTOLIC CONGESTIVE HEART FAILURE: ICD-10-CM

## 2023-02-21 DIAGNOSIS — I47.20 VT (VENTRICULAR TACHYCARDIA): ICD-10-CM

## 2023-02-21 DIAGNOSIS — Z95.810 ICD (IMPLANTABLE CARDIOVERTER-DEFIBRILLATOR), DUAL, IN SITU: ICD-10-CM

## 2023-02-21 DIAGNOSIS — I42.0 DILATED CARDIOMYOPATHY: ICD-10-CM

## 2023-02-21 DIAGNOSIS — I42.0 DILATED CARDIOMYOPATHY: Primary | ICD-10-CM

## 2023-02-21 DIAGNOSIS — Z95.810 ICD (IMPLANTABLE CARDIOVERTER-DEFIBRILLATOR), DUAL, IN SITU: Primary | ICD-10-CM

## 2023-02-21 PROCEDURE — 99214 OFFICE O/P EST MOD 30 MIN: CPT | Performed by: INTERNAL MEDICINE

## 2023-02-21 PROCEDURE — 93283 PRGRMG EVAL IMPLANTABLE DFB: CPT | Performed by: INTERNAL MEDICINE

## 2023-02-21 NOTE — PROGRESS NOTES
Normal Dual Chamber ICD Device Interrogation and Device Testing.  Normal evaluation of device function and lead measurements.  No optimization was needed of parameters or maximization of device longevity.  Patient is on automated Home Remote Monitoring.  Remaining battery is 1 1/2 years remaining.

## 2023-02-21 NOTE — PROGRESS NOTES
Chief Complaint  Cardiomyopathy    Subjective            Rubin Ratliff presents to [facility] CARDIOLOGY  Cardiomyopathy  Pertinent negatives include no chest pain.       Mr. Ratliff is here for follow-up evaluation management of dilated cardiomyopathy with chronic systolic heart failure, ventricular tachycardia, and dual-chamber ICD.  Since last visit he is doing relatively well.  He has had some right hip pain but otherwise denies chest pain palpitations or excessive shortness of breath.  He is compliant with his medications.     PMH  Past Medical History:   Diagnosis Date   • CHF (congestive heart failure) (HCC)    • Hypertension          SURGICALHX  Past Surgical History:   Procedure Laterality Date   • CARDIAC CATHETERIZATION     • INSERT / REPLACE / REMOVE PACEMAKER          SOC  Social History     Socioeconomic History   • Marital status:    Tobacco Use   • Smoking status: Never   • Smokeless tobacco: Never   Vaping Use   • Vaping Use: Never used   Substance and Sexual Activity   • Alcohol use: Never   • Drug use: Never   • Sexual activity: Defer         FAMHX  Family History   Problem Relation Age of Onset   • Heart disease Father    • Heart failure Father           ALLERGY  Allergies   Allergen Reactions   • Penicillins Hives        MEDSCURRENT    Current Outpatient Medications:   •  albuterol sulfate  (90 Base) MCG/ACT inhaler, Inhale 2 puffs., Disp: , Rfl:   •  calcium carbonate (OS-FACUNDO) 600 MG tablet, Take 600 mg by mouth Daily., Disp: , Rfl:   •  denosumab (PROLIA) 60 MG/ML solution prefilled syringe syringe, Inject 60 mg under the skin into the appropriate area as directed. Every 6 months, Disp: , Rfl:   •  fluticasone-salmeterol (ADVAIR) 100-50 MCG/DOSE DISKUS, Inhale 2 puffs Daily., Disp: , Rfl:   •  fosinopril (MONOPRIL) 10 MG tablet, Take 10 mg by mouth Daily., Disp: , Rfl:   •  furosemide (LASIX) 40 MG tablet, Take 40 mg by mouth Daily., Disp: , Rfl:   •   "metOLazone (ZAROXOLYN) 2.5 MG tablet, As Needed., Disp: , Rfl:   •  metoprolol succinate XL (TOPROL-XL) 50 MG 24 hr tablet, Take 50 mg by mouth Daily., Disp: , Rfl:   •  omeprazole (priLOSEC) 20 MG capsule, Take 20 mg by mouth Daily., Disp: , Rfl:   •  spironolactone (ALDACTONE) 25 MG tablet, Take 25 mg by mouth Daily., Disp: , Rfl:   •  tamsulosin (FLOMAX) 0.4 MG capsule 24 hr capsule, Take 0.4 mg by mouth Daily., Disp: , Rfl:       Review of Systems   Cardiovascular: Negative for chest pain, dyspnea on exertion and palpitations.   Musculoskeletal: Positive for joint pain.        Objective     /71   Pulse 67   Ht 177.8 cm (70\")   Wt 65.8 kg (145 lb)   BMI 20.81 kg/m²       General Appearance:   · well developed  · well nourished  HENT:   · oropharynx moist  · lips not cyanotic  Neck:  · thyroid not enlarged  · supple  Respiratory:  · no respiratory distress  · normal breath sounds  · no rales  Cardiovascular:  · no jugular venous distention  · regular rhythm  · apical impulse normal  · S1 normal, S2 normal  · no S3, no S4   · no murmur  · no rub, no thrill  · carotid pulses normal; no bruit  · pedal pulses normal  · lower extremity edema: none    Musculoskeletal:  · no clubbing of fingers.   · normocephalic, head atraumatic  Skin:   · warm, dry  Psychiatric:  · judgement and insight appropriate  · normal mood and affect      Result Review :             Data reviewed: ICD interrogation today shows normal device function, 1-1/2 years battery life, very short nonsustained episodes, normal lead system function     Procedures               Assessment and Plan        ASSESSMENT:  Encounter Diagnoses   Name Primary?   • Dilated cardiomyopathy (HCC) Yes   • Chronic systolic congestive heart failure (HCC)    • VT (ventricular tachycardia)    • ICD (implantable cardioverter-defibrillator), dual, in situ          PLAN:    1.  Dilated cardiomyopathy with chronic systolic congestive heart failure, clinically stable, " continue current medical therapy.  2.  Ventricular tachycardia, stable, no significant recurrence on remote ICD monitoring.  3.  Dual-chamber ICD, normal device function with adequate battery life, continue remote monitoring    Follow-up in 6 months          Patient was given instructions and counseling regarding his condition or for health maintenance advice. Please see specific information pulled into the AVS if appropriate.             JAMES Greer MD  2/21/2023    10:58 EST

## 2023-04-04 ENCOUNTER — TELEPHONE (OUTPATIENT)
Dept: CARDIOLOGY | Facility: CLINIC | Age: 83
End: 2023-04-04
Payer: MEDICARE

## 2023-04-04 NOTE — TELEPHONE ENCOUNTER
PTS DAUGHTER CALLED ASKING IF IT WAS OKAY FOR HIM TO USE THE SALONPAS (ICY HOT) PATCHES, FOR BACK PAIN. SHE WAS UNSURE DUE TO HIS DEVICE.   PLEASE ADVISE

## 2023-08-22 ENCOUNTER — OFFICE VISIT (OUTPATIENT)
Dept: CARDIOLOGY | Facility: CLINIC | Age: 83
End: 2023-08-22
Payer: MEDICARE

## 2023-08-22 VITALS
SYSTOLIC BLOOD PRESSURE: 105 MMHG | HEIGHT: 70 IN | BODY MASS INDEX: 21.05 KG/M2 | DIASTOLIC BLOOD PRESSURE: 65 MMHG | HEART RATE: 69 BPM | WEIGHT: 147 LBS

## 2023-08-22 DIAGNOSIS — I47.20 VT (VENTRICULAR TACHYCARDIA): ICD-10-CM

## 2023-08-22 DIAGNOSIS — Z95.810 ICD (IMPLANTABLE CARDIOVERTER-DEFIBRILLATOR), DUAL, IN SITU: ICD-10-CM

## 2023-08-22 DIAGNOSIS — I42.0 DILATED CARDIOMYOPATHY: Primary | ICD-10-CM

## 2023-08-22 DIAGNOSIS — I50.22 CHRONIC SYSTOLIC CONGESTIVE HEART FAILURE: ICD-10-CM

## 2023-08-22 PROCEDURE — 1159F MED LIST DOCD IN RCRD: CPT | Performed by: INTERNAL MEDICINE

## 2023-08-22 PROCEDURE — 99214 OFFICE O/P EST MOD 30 MIN: CPT | Performed by: INTERNAL MEDICINE

## 2023-08-22 PROCEDURE — 1160F RVW MEDS BY RX/DR IN RCRD: CPT | Performed by: INTERNAL MEDICINE

## 2023-08-22 RX ORDER — FLUTICASONE PROPIONATE 50 MCG
2 SPRAY, SUSPENSION (ML) NASAL DAILY
COMMUNITY

## 2023-08-22 NOTE — PROGRESS NOTES
"Chief Complaint  Cardiomyopathy    Subjective            Rubin Ratliff presents to [facility] CARDIOLOGY      Mr. Ratliff is here for follow-up evaluation management of dilated cardiomyopathy with chronic systolic heart failure, ventricular tachycardia, and dual-chamber ICD.  Since the last visit he is compliant with his medical therapy.  He does have significant shortness of breath with exertion.  He occasionally takes an extra metolazone based on his weight.  He has relatively poor baseline kidney function.  No palpitations.    PMH  Past Medical History:   Diagnosis Date    CHF (congestive heart failure)     Hypertension     ICD (implantable cardioverter-defibrillator) in place  \" NOT MRI COMPATIBLE\"  E142          SURGICALHX  Past Surgical History:   Procedure Laterality Date    CARDIAC CATHETERIZATION      INSERT / REPLACE / REMOVE PACEMAKER          SOC  Social History     Socioeconomic History    Marital status:    Tobacco Use    Smoking status: Never    Smokeless tobacco: Never   Vaping Use    Vaping Use: Never used   Substance and Sexual Activity    Alcohol use: Never    Drug use: Never    Sexual activity: Defer         FAMHX  Family History   Problem Relation Age of Onset    Heart disease Father     Heart failure Father           ALLERGY  Allergies   Allergen Reactions    Penicillins Hives        MEDSCURRENT    Current Outpatient Medications:     albuterol sulfate  (90 Base) MCG/ACT inhaler, Inhale 2 puffs., Disp: , Rfl:     calcium carbonate (OS-FACUNDO) 600 MG tablet, Take 1 tablet by mouth Daily., Disp: , Rfl:     denosumab (PROLIA) 60 MG/ML solution prefilled syringe syringe, Inject 1 mL under the skin into the appropriate area as directed. Every 6 months, Disp: , Rfl:     fluticasone (FLONASE) 50 MCG/ACT nasal spray, 2 sprays into the nostril(s) as directed by provider Daily., Disp: , Rfl:     fluticasone-salmeterol (ADVAIR) 100-50 MCG/DOSE DISKUS, Inhale 2 puffs " "Daily., Disp: , Rfl:     fosinopril (MONOPRIL) 10 MG tablet, Take 1 tablet by mouth Daily., Disp: , Rfl:     furosemide (LASIX) 40 MG tablet, Take 1 tablet by mouth Daily., Disp: , Rfl:     metOLazone (ZAROXOLYN) 2.5 MG tablet, As Needed., Disp: , Rfl:     metoprolol succinate XL (TOPROL-XL) 50 MG 24 hr tablet, Take 1 tablet by mouth Daily., Disp: , Rfl:     omeprazole (priLOSEC) 20 MG capsule, Take 1 capsule by mouth Daily., Disp: , Rfl:     spironolactone (ALDACTONE) 25 MG tablet, Take 1 tablet by mouth Daily., Disp: , Rfl:     tamsulosin (FLOMAX) 0.4 MG capsule 24 hr capsule, Take 1 capsule by mouth Daily., Disp: , Rfl:       Review of Systems   Cardiovascular:  Positive for dyspnea on exertion. Negative for chest pain and palpitations.   Respiratory:  Positive for shortness of breath.       Objective     /65   Pulse 69   Ht 177.8 cm (70\")   Wt 66.7 kg (147 lb)   BMI 21.09 kg/mý       General Appearance:   well developed  well nourished  HENT:   oropharynx moist  lips not cyanotic  Neck:  thyroid not enlarged  supple  Respiratory:  no respiratory distress  normal breath sounds  no rales  Cardiovascular:  no jugular venous distention  regular rhythm  apical impulse normal  S1 normal, S2 normal  no S3, no S4   no murmur  no rub, no thrill  carotid pulses normal; no bruit  pedal pulses normal  lower extremity edema: none    Musculoskeletal:  no clubbing of fingers.   normocephalic, head atraumatic  Skin:   warm, dry  Psychiatric:  judgement and insight appropriate  normal mood and affect      Result Review :             Data reviewed : ICD remote monitoring shows normal device function, 1.5-year battery life, 46% right ventricular pacing, creatinine 2.0      Procedures               Assessment and Plan        ASSESSMENT:  Encounter Diagnoses   Name Primary?    Dilated cardiomyopathy Yes    ICD (implantable cardioverter-defibrillator), dual, in situ     Chronic systolic congestive heart failure     VT " (ventricular tachycardia)          PLAN:    1.  Dilated cardiomyopathy with chronic systolic congestive heart failure, clinically stable, volume status stable.  Continue guideline directed medical therapy.  He has relatively poor baseline renal function, I do not think he would tolerate Entresto for that reason and also he has morning hypotension often.  He is symptomatic but I think optimized as well as we can currently.  2.  Ventricular tachycardia, stable, no significant recurrence on remote ICD monitoring.  3.  Dual-chamber ICD, normal device function with adequate battery life, continue remote monitoring    Follow-up in 6 months with device check          Patient was given instructions and counseling regarding his condition or for health maintenance advice. Please see specific information pulled into the AVS if appropriate.             JAMES Greer MD  8/22/2023    11:07 EDT

## 2024-02-20 ENCOUNTER — CLINICAL SUPPORT NO REQUIREMENTS (OUTPATIENT)
Dept: CARDIOLOGY | Facility: CLINIC | Age: 84
End: 2024-02-20
Payer: MEDICARE

## 2024-02-20 ENCOUNTER — OFFICE VISIT (OUTPATIENT)
Dept: CARDIOLOGY | Facility: CLINIC | Age: 84
End: 2024-02-20
Payer: MEDICARE

## 2024-02-20 VITALS
HEART RATE: 73 BPM | SYSTOLIC BLOOD PRESSURE: 110 MMHG | HEIGHT: 70 IN | BODY MASS INDEX: 21.9 KG/M2 | DIASTOLIC BLOOD PRESSURE: 69 MMHG | WEIGHT: 153 LBS

## 2024-02-20 DIAGNOSIS — I42.0 DILATED CARDIOMYOPATHY: ICD-10-CM

## 2024-02-20 DIAGNOSIS — I50.22 CHRONIC SYSTOLIC CONGESTIVE HEART FAILURE: Primary | ICD-10-CM

## 2024-02-20 DIAGNOSIS — Z95.810 ICD (IMPLANTABLE CARDIOVERTER-DEFIBRILLATOR), DUAL, IN SITU: ICD-10-CM

## 2024-02-20 DIAGNOSIS — I47.20 VT (VENTRICULAR TACHYCARDIA): ICD-10-CM

## 2024-02-20 PROCEDURE — 93283 PRGRMG EVAL IMPLANTABLE DFB: CPT | Performed by: INTERNAL MEDICINE

## 2024-02-20 NOTE — PROGRESS NOTES
"Chief Complaint  Cardiomyopathy and icd    Subjective            Rubin Ratliff presents to [facility] CARDIOLOGY      Mr. Ratliff is here for follow-up evaluation management of dilated cardiomyopathy with chronic systolic heart failure, ventricular tachycardia, and dual-chamber ICD.  Overall he is doing relatively well.  He does struggle at times with low blood pressure.  His metoprolol dose was reduced previously.  Some of his dizzy spells sound like vertigo as well.    PMH  Past Medical History:   Diagnosis Date    CHF (congestive heart failure)     Hypertension     ICD (implantable cardioverter-defibrillator) in place  \" NOT MRI COMPATIBLE\"  E142          SURGICALHX  Past Surgical History:   Procedure Laterality Date    CARDIAC CATHETERIZATION      INSERT / REPLACE / REMOVE PACEMAKER          SOC  Social History     Socioeconomic History    Marital status:    Tobacco Use    Smoking status: Never    Smokeless tobacco: Never   Vaping Use    Vaping Use: Never used   Substance and Sexual Activity    Alcohol use: Never    Drug use: Never    Sexual activity: Defer         FAMHX  Family History   Problem Relation Age of Onset    Heart disease Father     Heart failure Father           ALLERGY  Allergies   Allergen Reactions    Penicillins Hives        MEDSCURRENT    Current Outpatient Medications:     albuterol sulfate  (90 Base) MCG/ACT inhaler, Inhale 2 puffs., Disp: , Rfl:     calcium carbonate (OS-FACUNDO) 600 MG tablet, Take 1 tablet by mouth Daily., Disp: , Rfl:     denosumab (PROLIA) 60 MG/ML solution prefilled syringe syringe, Inject 1 mL under the skin into the appropriate area as directed. Every 6 months, Disp: , Rfl:     fluticasone (FLONASE) 50 MCG/ACT nasal spray, 2 sprays into the nostril(s) as directed by provider Daily., Disp: , Rfl:     fluticasone-salmeterol (ADVAIR) 100-50 MCG/DOSE DISKUS, Inhale 2 puffs Daily., Disp: , Rfl:     fosinopril (MONOPRIL) 10 MG tablet, Take " "1 tablet by mouth Daily. Pt taking 1/2 tablet  a day, Disp: , Rfl:     furosemide (LASIX) 40 MG tablet, Take 1 tablet by mouth Daily., Disp: , Rfl:     metOLazone (ZAROXOLYN) 2.5 MG tablet, As Needed., Disp: , Rfl:     metoprolol succinate XL (TOPROL-XL) 50 MG 24 hr tablet, Take 0.5 tablets by mouth Daily., Disp: , Rfl:     omeprazole (priLOSEC) 20 MG capsule, Take 1 capsule by mouth Daily., Disp: , Rfl:     spironolactone (ALDACTONE) 25 MG tablet, Take 1 tablet by mouth Daily., Disp: , Rfl:     tamsulosin (FLOMAX) 0.4 MG capsule 24 hr capsule, Take 1 capsule by mouth Daily., Disp: , Rfl:       Review of Systems   Cardiovascular:  Positive for dyspnea on exertion. Negative for chest pain and palpitations.   Respiratory:  Positive for shortness of breath.         Objective     /69   Pulse 73   Ht 177.8 cm (70\")   Wt 69.4 kg (153 lb)   BMI 21.95 kg/m²       General Appearance:   well developed  well nourished  HENT:   oropharynx moist  lips not cyanotic  Neck:  thyroid not enlarged  supple  Respiratory:  no respiratory distress  normal breath sounds  no rales  Cardiovascular:  no jugular venous distention  regular rhythm  apical impulse normal  S1 normal, S2 normal  no S3, no S4   no murmur  no rub, no thrill  carotid pulses normal; no bruit  pedal pulses normal  lower extremity edema: none    Musculoskeletal:  no clubbing of fingers.   normocephalic, head atraumatic  Skin:   warm, dry  Psychiatric:  judgement and insight appropriate  normal mood and affect      Result Review :             Data reviewed : ICD interrogated, normal device function, 47% ventricular pacing, 10-month battery life,      Procedures               Assessment and Plan        ASSESSMENT:  Encounter Diagnoses   Name Primary?    Chronic systolic congestive heart failure Yes    Dilated cardiomyopathy     ICD (implantable cardioverter-defibrillator), dual, in situ          PLAN:    1.  Dilated cardiomyopathy with chronic systolic " congestive heart failure, clinically stable, volume status stable.  Continue guideline directed medical therapy.  His blood pressure has been soft.  I agree with his current dose of medical therapy.  If he continues to have symptomatic hypotension I would get rid of the Monopril in lieu of the beta-blocker.  2.  Ventricular tachycardia, stable, no significant recurrence on remote ICD monitoring.  3.  Dual-chamber ICD, normal device function with adequate battery life, continue remote monitoring    Follow-up in 6 months          Patient was given instructions and counseling regarding his condition or for health maintenance advice. Please see specific information pulled into the AVS if appropriate.             JAMES Greer MD  2/20/2024    11:00 EST

## 2024-02-20 NOTE — PROGRESS NOTES
Normal Dual Chamber ICD Device Interrogation and Device Testing.  Normal evaluation of device function and lead measurements.  No optimization was needed of parameters or maximization of device longevity.  Patient is on automated Home Remote Monitoring.  Remaining battery is 10 months.

## 2024-06-18 ENCOUNTER — OUTSIDE FACILITY SERVICE (OUTPATIENT)
Dept: CARDIOLOGY | Facility: CLINIC | Age: 84
End: 2024-06-18
Payer: MEDICARE

## 2024-07-08 ENCOUNTER — TELEPHONE (OUTPATIENT)
Dept: CARDIOLOGY | Facility: CLINIC | Age: 84
End: 2024-07-08
Payer: MEDICARE

## 2024-07-08 NOTE — TELEPHONE ENCOUNTER
The [facility] received a fax that requires your attention. The document has been indexed to the patient’s chart for your review.      Reason for sending: REC'D FAX ATTENTION TO DR. PEOPLES    Documents Description: PN 7/1/24    Name of Sender: ZACHARY KRAUSE MD    Date Indexed: 7/1/24     Notes (if needed):

## 2024-07-18 ENCOUNTER — TELEPHONE (OUTPATIENT)
Dept: CARDIOLOGY | Facility: CLINIC | Age: 84
End: 2024-07-18
Payer: MEDICARE

## 2024-07-18 ENCOUNTER — PREP FOR SURGERY (OUTPATIENT)
Dept: OTHER | Facility: HOSPITAL | Age: 84
End: 2024-07-18
Payer: MEDICARE

## 2024-07-18 DIAGNOSIS — Z45.02 ICD (IMPLANTABLE CARDIOVERTER-DEFIBRILLATOR) BATTERY DEPLETION: Primary | ICD-10-CM

## 2024-07-18 RX ORDER — SODIUM CHLORIDE 0.9 % (FLUSH) 0.9 %
10 SYRINGE (ML) INJECTION EVERY 12 HOURS SCHEDULED
OUTPATIENT
Start: 2024-07-18

## 2024-07-18 RX ORDER — SODIUM CHLORIDE 9 MG/ML
40 INJECTION, SOLUTION INTRAVENOUS AS NEEDED
OUTPATIENT
Start: 2024-07-18

## 2024-07-18 RX ORDER — SODIUM CHLORIDE 0.9 % (FLUSH) 0.9 %
10 SYRINGE (ML) INJECTION AS NEEDED
OUTPATIENT
Start: 2024-07-18

## 2024-07-18 NOTE — TELEPHONE ENCOUNTER
Mr. Ratliff has a [facility] Dual Chamber ICD that has reached CHRISTINE.    I explained the ICD Battery change out procedure and that it is an outpatient procedure.  He will be contacted once Dr. Greer reviews and has it scheduled.

## 2024-07-23 ENCOUNTER — TELEPHONE (OUTPATIENT)
Dept: CARDIOLOGY | Facility: CLINIC | Age: 84
End: 2024-07-23
Payer: MEDICARE

## 2024-07-23 NOTE — TELEPHONE ENCOUNTER
I spoke to patient and gave an arrival time of 6:30 on 08/01/24 for [employer] dual chamber ICD battery change. Patient was instructed to have a  for the day of the procedure and to arrive at the entrance C registration area. Patient was instructed to continue all medications as usual. Patient was instructed to be NPO after midnight with only sips of water as needed. Patient was instructed to have labs completed on the morning of the procedure. Patient is agreeable with no other questions or concerns.

## 2024-07-31 NOTE — PRE-PROCEDURE INSTRUCTIONS
PATIENT INSTRUCTED TO BE:    - NPO AFTER MIDNIGHT EXCEPT CAN HAVE SIPS OF WATER WITH HIS MEDICATION PRIOR TO PROCEDURE    -  INSTRUCTED NO LOTIONS, JEWELRY, PIERCINGS, OR DEODORANT DAY OF THE PROCEDURE    - INSTRUCTED TO TAKE THE FOLLOWING MEDICATIONS THE DAY OF SURGERY:       AS DIRECTED BY CARDIOLOGY    - INSTRUCTED PT TO FOLLOW ANY INSTRUCTIONS GIVEN BY HIS CARDIOLOGIST.    - INFORMED PT THEY ATTEMPT RADIAL APPROACH FIRST IF UNABLE TO PERFORM CATH WITH THAT APPROACH THEY WILL DO A FEMORAL APPROACH.  ALSO, INFORMED PT THEY WILL BE ON BEDREST POSTOP.  IF THE SURGEON FEELS  AN INTERVENTION OR STENTS NEEDS TO BE PLACED, HE/SHE WILL STAY OVER NIGHT FOR OBSERVATION AND MONITORING.   PACEMAKER GENERATOR CHANGE  - INFORMED THE PATIENT HE CAN HAVE TWO VISITORS WITH HIM THE DAY OF THE PROCEDURE    - INSTRUCTED PT TO PARK ON THE NORTH SIDE OF THE HOSPITAL IN THE OPEN PARKING LOT, ENTER THE HOSPITAL THRU ENTRANCE C/ [address]TH TOWER AND  CHECK IN AT THE REGISTRATION DESK, AFTER REGISTRATION PT WILL BE TAKEN THE THE PREOP AREA FOR HIS OR HER PROCEDURE.    -ARRIVAL TIME GIVEN BY CARDIOLOGIST OFFICE, INFORMED PT IF ARRIVAL TIME CHANGES OR ADJUSTMENTS NEED TO BE MADE IN THEIR ARRIVAL TIME, HE/SHE WOULD RECEIVE A CALL.    -INSTRUCTED PT TO COME TO [facility] TO GET THEIR LABS/ EKG DONE  PRIOR TO PROCEDURE      - PATIENT VERBALIZED UNDERSTANDING

## 2024-08-01 ENCOUNTER — HOSPITAL ENCOUNTER (OUTPATIENT)
Facility: HOSPITAL | Age: 84
Setting detail: HOSPITAL OUTPATIENT SURGERY
Discharge: HOME | End: 2024-08-01
Attending: INTERNAL MEDICINE | Admitting: INTERNAL MEDICINE
Payer: MEDICARE

## 2024-08-01 VITALS
BODY MASS INDEX: 20.89 KG/M2 | HEIGHT: 70 IN | WEIGHT: 145.94 LBS | HEART RATE: 73 BPM | OXYGEN SATURATION: 98 % | TEMPERATURE: 97.3 F | DIASTOLIC BLOOD PRESSURE: 69 MMHG | RESPIRATION RATE: 19 BRPM | SYSTOLIC BLOOD PRESSURE: 94 MMHG

## 2024-08-01 DIAGNOSIS — Z45.02 ICD (IMPLANTABLE CARDIOVERTER-DEFIBRILLATOR) BATTERY DEPLETION: ICD-10-CM

## 2024-08-01 LAB
ANION GAP SERPL CALCULATED.3IONS-SCNC: 14.4 MMOL/L (ref 5–15)
BUN SERPL-MCNC: 49 MG/DL (ref 8–23)
BUN/CREAT SERPL: 23.8 (ref 7–25)
CALCIUM SPEC-SCNC: 9.1 MG/DL (ref 8.6–10.5)
CHLORIDE SERPL-SCNC: 95 MMOL/L (ref 98–107)
CO2 SERPL-SCNC: 25.6 MMOL/L (ref 22–29)
CREAT SERPL-MCNC: 2.06 MG/DL (ref 0.76–1.27)
DEPRECATED RDW RBC AUTO: 40.6 FL (ref 37–54)
EGFRCR SERPLBLD CKD-EPI 2021: 31 ML/MIN/1.73
ERYTHROCYTE [DISTWIDTH] IN BLOOD BY AUTOMATED COUNT: 12.5 % (ref 12.3–15.4)
GLUCOSE SERPL-MCNC: 103 MG/DL (ref 65–99)
HCT VFR BLD AUTO: 38.1 % (ref 37.5–51)
HGB BLD-MCNC: 12.7 G/DL (ref 13–17.7)
INR PPP: 1 (ref 0.86–1.15)
MCH RBC QN AUTO: 29.6 PG (ref 26.6–33)
MCHC RBC AUTO-ENTMCNC: 33.3 G/DL (ref 31.5–35.7)
MCV RBC AUTO: 88.8 FL (ref 79–97)
PLATELET # BLD AUTO: 220 10*3/MM3 (ref 140–450)
PMV BLD AUTO: 10.7 FL (ref 6–12)
POTASSIUM SERPL-SCNC: 4 MMOL/L (ref 3.5–5.2)
PROTHROMBIN TIME: 13.4 SECONDS (ref 11.8–14.9)
QT INTERVAL: 491 MS
QTC INTERVAL: 546 MS
RBC # BLD AUTO: 4.29 10*6/MM3 (ref 4.14–5.8)
SODIUM SERPL-SCNC: 135 MMOL/L (ref 136–145)
WBC NRBC COR # BLD AUTO: 9.57 10*3/MM3 (ref 3.4–10.8)

## 2024-08-01 PROCEDURE — 33263 RMVL & RPLCMT DFB GEN 2 LEAD: CPT | Performed by: INTERNAL MEDICINE

## 2024-08-01 PROCEDURE — 93005 ELECTROCARDIOGRAM TRACING: CPT | Performed by: INTERNAL MEDICINE

## 2024-08-01 PROCEDURE — 25010000002 MIDAZOLAM PER 1MG: Performed by: INTERNAL MEDICINE

## 2024-08-01 PROCEDURE — 85027 COMPLETE CBC AUTOMATED: CPT | Performed by: INTERNAL MEDICINE

## 2024-08-01 PROCEDURE — 25010000002 DIPHENHYDRAMINE PER 50 MG: Performed by: INTERNAL MEDICINE

## 2024-08-01 PROCEDURE — 25010000002 BUPIVACAINE (PF) 0.5 % SOLUTION: Performed by: INTERNAL MEDICINE

## 2024-08-01 PROCEDURE — C1721 AICD, DUAL CHAMBER: HCPCS | Performed by: INTERNAL MEDICINE

## 2024-08-01 PROCEDURE — 25010000002 FENTANYL CITRATE (PF) 100 MCG/2ML SOLUTION: Performed by: INTERNAL MEDICINE

## 2024-08-01 PROCEDURE — 85610 PROTHROMBIN TIME: CPT | Performed by: INTERNAL MEDICINE

## 2024-08-01 PROCEDURE — 80048 BASIC METABOLIC PNL TOTAL CA: CPT | Performed by: INTERNAL MEDICINE

## 2024-08-01 PROCEDURE — 25010000002 CEFAZOLIN PER 500 MG: Performed by: INTERNAL MEDICINE

## 2024-08-01 DEVICE — IMPLANTABLE CARDIOVERTER DEFIBRILLATOR DR
Type: IMPLANTABLE DEVICE | Site: SUBCLAVIAN | Status: FUNCTIONAL
Brand: RESONATE™ HF ICD DR

## 2024-08-01 RX ORDER — BUPIVACAINE HYDROCHLORIDE 5 MG/ML
INJECTION, SOLUTION EPIDURAL; INTRACAUDAL
Status: DISCONTINUED | OUTPATIENT
Start: 2024-08-01 | End: 2024-08-01 | Stop reason: HOSPADM

## 2024-08-01 RX ORDER — SODIUM CHLORIDE 0.9 % (FLUSH) 0.9 %
10 SYRINGE (ML) INJECTION AS NEEDED
Status: DISCONTINUED | OUTPATIENT
Start: 2024-08-01 | End: 2024-08-01 | Stop reason: HOSPADM

## 2024-08-01 RX ORDER — SODIUM CHLORIDE 9 MG/ML
40 INJECTION, SOLUTION INTRAVENOUS AS NEEDED
Status: DISCONTINUED | OUTPATIENT
Start: 2024-08-01 | End: 2024-08-01 | Stop reason: HOSPADM

## 2024-08-01 RX ORDER — DIPHENHYDRAMINE HYDROCHLORIDE 50 MG/ML
INJECTION INTRAMUSCULAR; INTRAVENOUS
Status: DISCONTINUED | OUTPATIENT
Start: 2024-08-01 | End: 2024-08-01 | Stop reason: HOSPADM

## 2024-08-01 RX ORDER — LIDOCAINE HYDROCHLORIDE 20 MG/ML
INJECTION, SOLUTION INFILTRATION; PERINEURAL
Status: DISCONTINUED | OUTPATIENT
Start: 2024-08-01 | End: 2024-08-01 | Stop reason: HOSPADM

## 2024-08-01 RX ORDER — SODIUM CHLORIDE 0.9 % (FLUSH) 0.9 %
10 SYRINGE (ML) INJECTION EVERY 12 HOURS SCHEDULED
Status: DISCONTINUED | OUTPATIENT
Start: 2024-08-01 | End: 2024-08-01 | Stop reason: HOSPADM

## 2024-08-01 RX ORDER — ENALAPRIL MALEATE 2.5 MG/1
2.5 TABLET ORAL DAILY
COMMUNITY
Start: 2024-07-01

## 2024-08-01 RX ORDER — BUMETANIDE 1 MG/1
1 TABLET ORAL DAILY
COMMUNITY

## 2024-08-01 RX ORDER — FENTANYL CITRATE 50 UG/ML
INJECTION, SOLUTION INTRAMUSCULAR; INTRAVENOUS
Status: DISCONTINUED | OUTPATIENT
Start: 2024-08-01 | End: 2024-08-01 | Stop reason: HOSPADM

## 2024-08-01 RX ORDER — MIDAZOLAM HYDROCHLORIDE 2 MG/2ML
INJECTION, SOLUTION INTRAMUSCULAR; INTRAVENOUS
Status: DISCONTINUED | OUTPATIENT
Start: 2024-08-01 | End: 2024-08-01 | Stop reason: HOSPADM

## 2024-08-01 NOTE — DISCHARGE INSTRUCTIONS
Discharge Instructions for Permanent Pacemaker/ Defibrillator Implant    *Keep the incision dry until wound check appointment.  Sponge bathe or hand shower until then.  If it gets a little wet, just pat gen and do not rub the site.    *The Dermabond adhesive will come off over 2-3 weeks on its own.    *Do not raise your elbow on the side of the pacemaker/defibrillator implant above the shoulder level for two weeks but move your arm freely otherwise.    *No heavy lifting (no more than 10 pounds) for one week then resume normal activity.    *Avoid activity that involves rough contact with the pacemaker site.    *Do not swing a bat or golf club for two weeks. Do not swim for two weeks.  It is advised not to drive for first week post-op to protect the incision    *You can resume your normal activities and work as you feel you are able, unless you have been instructed otherwise.    *Sexual activity can be resumed after pacemaker/defibrillator implant.    *Report any signs of infection, fever, pain, swelling, redness, oozing or heat at the site especially if the symptoms increase after the first 3 to 4 days. Call my office at   [phone] if signs of infection occurs.    *You you will already have an appointment made to allow the wound to be examined for proper healing, this visit is done by the device technician, not the doctor. Another appointment will normally be needed in three months with the doctor.    *Know your pacemaker 's name (Shyp)   All testing, troubleshooting and programming of your pacemaker depends on knowing the manufacture of the device. You will receive an ID card for your pacemaker. Carry your pacemaker identification card with you at all times.

## 2024-08-01 NOTE — H&P
"Chief Complaint  No chief complaint on file.    Subjective            Rubin Ratliff presents to [facility] PRE OP PHASE II      Mr. Ratliff is here for follow-up evaluation management of dilated cardiomyopathy with chronic systolic heart failure, ventricular tachycardia, and dual-chamber ICD.  He is here for elective ICD generator change.  He was having fluid retention and inc SOA during Supriya.      PMH  Past Medical History:   Diagnosis Date    Asthma     Cardiomyopathy     CHF (congestive heart failure)     COPD (chronic obstructive pulmonary disease)     ESRD (end stage renal disease)     STAGE 3 B    GERD (gastroesophageal reflux disease)     Hypertension     ICD (implantable cardioverter-defibrillator) in place  \" NOT MRI COMPATIBLE\"  E142          SURGICALHX  Past Surgical History:   Procedure Laterality Date    CARDIAC CATHETERIZATION      HERNIA REPAIR      IHR    INSERT / REPLACE / REMOVE PACEMAKER      PLACED 2012    LUMBAR DISCECTOMY      W/ FUSION    SINUS SURGERY      TOTAL HIP ARTHROPLASTY REVISION          SOC  Social History     Socioeconomic History    Marital status:    Tobacco Use    Smoking status: Never    Smokeless tobacco: Never   Vaping Use    Vaping status: Never Used   Substance and Sexual Activity    Alcohol use: Never    Drug use: Never    Sexual activity: Defer         FAMHX  Family History   Problem Relation Age of Onset    Heart disease Father     Heart failure Father     Malig Hyperthermia Neg Hx           ALLERGY  Allergies   Allergen Reactions    Penicillins Hives        MEDSCURRENT    Current Facility-Administered Medications:     sodium chloride 0.9 % flush 10 mL, 10 mL, Intravenous, Q12H, JAMES Greer MD    sodium chloride 0.9 % flush 10 mL, 10 mL, Intravenous, PRN, JAMES Greer MD    sodium chloride 0.9 % infusion 40 mL, 40 mL, Intravenous, PRN, JAMES Greer MD      Review of Systems   Cardiovascular:  Positive for dyspnea on exertion. Negative for " "chest pain and palpitations.   Respiratory:  Positive for shortness of breath.         Objective     /72 (BP Location: Right arm, Patient Position: Sitting)   Pulse 80   Temp 98.3 °F (36.8 °C) (Temporal)   Resp 22   Ht 177.8 cm (70\")   Wt 66.2 kg (145 lb 15.1 oz)   SpO2 99%   BMI 20.94 kg/m²       General Appearance:   well developed  well nourished  HENT:   oropharynx moist  lips not cyanotic  Neck:  thyroid not enlarged  supple  Respiratory:  no respiratory distress  normal breath sounds  no rales  Cardiovascular:  no jugular venous distention  regular rhythm  apical impulse normal  S1 normal, S2 normal  no S3, no S4   no murmur  no rub, no thrill  carotid pulses normal; no bruit  pedal pulses normal  lower extremity edema: none    Musculoskeletal:  no clubbing of fingers.   normocephalic, head atraumatic  Skin:   warm, dry  Psychiatric:  judgement and insight appropriate  normal mood and affect      Result Review :             Data reviewed : ICD interrogated, normal device function, 47% ventricular pacing, 10-month battery life,      Procedures               Assessment and Plan        ASSESSMENT:  Encounter Diagnoses   Name Primary?    Chronic systolic congestive heart failure Yes    Dilated cardiomyopathy     ICD (implantable cardioverter-defibrillator), dual, in situ          PLAN:    1.  Dilated cardiomyopathy with chronic systolic congestive heart failure, stable currently.    2.  Dual-chamber ICD, proceed with ICD generator change.  Risk/benefit discussed              Patient was given instructions and counseling regarding his condition or for health maintenance advice. Please see specific information pulled into the AVS if appropriate.             JAMES Greer MD  8/1/2024    08:09 EDT  "

## 2024-08-20 ENCOUNTER — OFFICE VISIT (OUTPATIENT)
Dept: CARDIOLOGY | Facility: CLINIC | Age: 84
End: 2024-08-20
Payer: MEDICARE

## 2024-08-20 ENCOUNTER — CLINICAL SUPPORT NO REQUIREMENTS (OUTPATIENT)
Dept: CARDIOLOGY | Facility: CLINIC | Age: 84
End: 2024-08-20
Payer: MEDICARE

## 2024-08-20 VITALS
WEIGHT: 144 LBS | SYSTOLIC BLOOD PRESSURE: 108 MMHG | HEIGHT: 70 IN | HEART RATE: 78 BPM | BODY MASS INDEX: 20.62 KG/M2 | DIASTOLIC BLOOD PRESSURE: 68 MMHG | OXYGEN SATURATION: 94 %

## 2024-08-20 DIAGNOSIS — Z95.810 ICD (IMPLANTABLE CARDIOVERTER-DEFIBRILLATOR), DUAL, IN SITU: ICD-10-CM

## 2024-08-20 DIAGNOSIS — I50.22 CHRONIC SYSTOLIC CONGESTIVE HEART FAILURE: Primary | ICD-10-CM

## 2024-08-20 DIAGNOSIS — I50.22 CHRONIC SYSTOLIC CONGESTIVE HEART FAILURE: ICD-10-CM

## 2024-08-20 DIAGNOSIS — I42.0 DILATED CARDIOMYOPATHY: ICD-10-CM

## 2024-08-20 DIAGNOSIS — Z45.02 ICD (IMPLANTABLE CARDIOVERTER-DEFIBRILLATOR) BATTERY DEPLETION: Primary | ICD-10-CM

## 2024-08-20 PROCEDURE — 99214 OFFICE O/P EST MOD 30 MIN: CPT | Performed by: INTERNAL MEDICINE

## 2024-08-20 PROCEDURE — 93283 PRGRMG EVAL IMPLANTABLE DFB: CPT | Performed by: INTERNAL MEDICINE

## 2024-08-20 RX ORDER — FLUTICASONE FUROATE, UMECLIDINIUM BROMIDE AND VILANTEROL TRIFENATATE 100; 62.5; 25 UG/1; UG/1; UG/1
1 POWDER RESPIRATORY (INHALATION) DAILY
COMMUNITY

## 2024-08-20 NOTE — PROGRESS NOTES
Normal Dual Chamber ICD Device Interrogation and Device Testing.  Normal evaluation of device function and lead measurements.  No optimization was needed of parameters or maximization of device longevity.  Patient is on automated Home Remote Monitoring.  Patients wound incision is healing and all corners are intact.  There are no signs of infection, no drainage, no swelling and cool to touch.  We reviewed Home remote follow up and the monitoring machine.  We also discussed the six week healing process and the do's and don'ts of activity that it specific to the patient.

## 2024-08-20 NOTE — PROGRESS NOTES
"Chief Complaint  chronic systolic congestive heart failure and Follow-up (6 month )    Subjective            Rubin Ratliff presents to [facility] CARDIOLOGY      Mr. Ratliff is here for follow-up evaluation management of dilated cardiomyopathy with chronic systolic heart failure, ventricular tachycardia, and dual-chamber ICD.  He underwent ICD generator replacement about 3 weeks ago.  The site is healing well.  He is compliant with his medical therapy.    PMH  Past Medical History:   Diagnosis Date    Asthma     Cardiomyopathy     CHF (congestive heart failure)     COPD (chronic obstructive pulmonary disease)     ESRD (end stage renal disease)     STAGE 3 B    GERD (gastroesophageal reflux disease)     Hypertension     ICD (implantable cardioverter-defibrillator) in place  \" NOT MRI COMPATIBLE\"  E142          SURGICALHX  Past Surgical History:   Procedure Laterality Date    CARDIAC CATHETERIZATION      HERNIA REPAIR      IHR    ICD GENERATOR REPLACEMENT N/A 8/1/2024    Procedure: Defibrillator Generator Change - DC -cisimple;  Surgeon: JAMES Greer MD;  Location: [facility] INVASIVE LOCATION;  Service: Cardiovascular;  Laterality: N/A;    INSERT / REPLACE / REMOVE PACEMAKER      PLACED 2012    LUMBAR DISCECTOMY      W/ FUSION    SINUS SURGERY      TOTAL HIP ARTHROPLASTY REVISION          SOC  Social History     Socioeconomic History    Marital status:    Tobacco Use    Smoking status: Never    Smokeless tobacco: Never   Vaping Use    Vaping status: Never Used   Substance and Sexual Activity    Alcohol use: Never    Drug use: Never    Sexual activity: Defer         FAMHX  Family History   Problem Relation Age of Onset    Heart disease Father     Heart failure Father     Malig Hyperthermia Neg Hx           ALLERGY  Allergies   Allergen Reactions    Penicillins Hives        MEDSCURRENT    Current Outpatient Medications:     albuterol sulfate  (90 Base) MCG/ACT inhaler, " "Inhale 2 puffs., Disp: , Rfl:     bumetanide (BUMEX) 1 MG tablet, Take 1 tablet by mouth Daily., Disp: , Rfl:     denosumab (PROLIA) 60 MG/ML solution prefilled syringe syringe, Inject 1 mL under the skin into the appropriate area as directed. Every 6 months, Disp: , Rfl:     enalapril (VASOTEC) 2.5 MG tablet, Take 1 tablet by mouth Daily., Disp: , Rfl:     fluticasone (FLONASE) 50 MCG/ACT nasal spray, 2 sprays into the nostril(s) as directed by provider Daily., Disp: , Rfl:     Fluticasone-Umeclidin-Vilant (Trelegy Ellipta) 100-62.5-25 MCG/ACT inhaler, Inhale 1 puff Daily., Disp: , Rfl:     metOLazone (ZAROXOLYN) 2.5 MG tablet, As Needed., Disp: , Rfl:     metoprolol succinate XL (TOPROL-XL) 50 MG 24 hr tablet, Take 0.5 tablets by mouth Daily., Disp: , Rfl:     omeprazole (priLOSEC) 20 MG capsule, Take 1 capsule by mouth Daily., Disp: , Rfl:     spironolactone (ALDACTONE) 25 MG tablet, Take 1 tablet by mouth Daily., Disp: , Rfl:     tamsulosin (FLOMAX) 0.4 MG capsule 24 hr capsule, Take 1 capsule by mouth Daily., Disp: , Rfl:       Review of Systems   Cardiovascular:  Positive for dyspnea on exertion. Negative for chest pain and palpitations.   Respiratory:  Positive for shortness of breath.         Objective     /68   Pulse 78   Ht 177.8 cm (70\")   Wt 65.3 kg (144 lb)   SpO2 94%   BMI 20.66 kg/m²       General Appearance:   well developed  well nourished  HENT:   oropharynx moist  lips not cyanotic  Neck:  thyroid not enlarged  supple  Respiratory:  no respiratory distress  normal breath sounds  no rales  Cardiovascular:  no jugular venous distention  regular rhythm  apical impulse normal  S1 normal, S2 normal  no S3, no S4   no murmur  no rub, no thrill  carotid pulses normal; no bruit  pedal pulses normal  lower extremity edema: none    Musculoskeletal:  no clubbing of fingers.   normocephalic, head atraumatic  Skin:   warm, dry, ICD site is healing well  Psychiatric:  judgement and insight " appropriate  normal mood and affect      Result Review :             Data reviewed : Normal device function, new battery life, 1 nonsustained ventricular episode      Procedures               Assessment and Plan        ASSESSMENT:  Encounter Diagnoses   Name Primary?    Chronic systolic congestive heart failure Yes    Dilated cardiomyopathy     ICD (implantable cardioverter-defibrillator), dual, in situ          PLAN:    1.  Dilated cardiomyopathy with chronic systolic congestive heart failure, clinically stable, volume status stable.  Continue guideline directed medical therapy.    2.  Ventricular tachycardia, stable, no significant recurrence on remote ICD monitoring.  3.  Dual-chamber ICD, normal device function, recent generator replacement with adequate healing    Follow-up in 6 months          Patient was given instructions and counseling regarding his condition or for health maintenance advice. Please see specific information pulled into the AVS if appropriate.             JAMES Greer MD  8/20/2024    10:13 EDT

## 2024-09-09 ENCOUNTER — TELEPHONE (OUTPATIENT)
Dept: CARDIOLOGY | Facility: CLINIC | Age: 84
End: 2024-09-09
Payer: MEDICARE

## 2024-09-09 NOTE — TELEPHONE ENCOUNTER
Hub staff attempted to follow warm transfer process and was unsuccessful     Caller: RONAN JESSICA    Relationship to patient: Emergency Contact    Best call back number: [phone]    Patient is needing: PATIENT'S DAUGHTER STATES THAT HER DAD DID PUSH THE BLUE HEART BUTTON ON MONITOR BUT WOULD LIKE TO VERIFY THAT IT IS WORKING CORRECTLY NOW.PLEASE CALL PATIENT'S DAUGHTER TO CONFIRM.

## 2025-02-18 ENCOUNTER — OFFICE VISIT (OUTPATIENT)
Dept: CARDIOLOGY | Facility: CLINIC | Age: 85
End: 2025-02-18
Payer: MEDICARE

## 2025-02-18 ENCOUNTER — CLINICAL SUPPORT NO REQUIREMENTS (OUTPATIENT)
Dept: CARDIOLOGY | Facility: CLINIC | Age: 85
End: 2025-02-18
Payer: MEDICARE

## 2025-02-18 VITALS
HEIGHT: 70 IN | HEART RATE: 89 BPM | WEIGHT: 151 LBS | DIASTOLIC BLOOD PRESSURE: 56 MMHG | BODY MASS INDEX: 21.62 KG/M2 | SYSTOLIC BLOOD PRESSURE: 97 MMHG

## 2025-02-18 DIAGNOSIS — R07.2 PRECORDIAL PAIN: Primary | ICD-10-CM

## 2025-02-18 DIAGNOSIS — Z95.810 ICD (IMPLANTABLE CARDIOVERTER-DEFIBRILLATOR), DUAL, IN SITU: ICD-10-CM

## 2025-02-18 DIAGNOSIS — I47.20 VT (VENTRICULAR TACHYCARDIA): ICD-10-CM

## 2025-02-18 DIAGNOSIS — Z45.02 ICD (IMPLANTABLE CARDIOVERTER-DEFIBRILLATOR) BATTERY DEPLETION: Primary | ICD-10-CM

## 2025-02-18 DIAGNOSIS — I42.0 DILATED CARDIOMYOPATHY: ICD-10-CM

## 2025-02-18 DIAGNOSIS — I50.22 CHRONIC SYSTOLIC CONGESTIVE HEART FAILURE: ICD-10-CM

## 2025-02-18 PROCEDURE — 1159F MED LIST DOCD IN RCRD: CPT | Performed by: INTERNAL MEDICINE

## 2025-02-18 PROCEDURE — 99214 OFFICE O/P EST MOD 30 MIN: CPT | Performed by: INTERNAL MEDICINE

## 2025-02-18 PROCEDURE — 1160F RVW MEDS BY RX/DR IN RCRD: CPT | Performed by: INTERNAL MEDICINE

## 2025-02-18 NOTE — PROGRESS NOTES
"Chief Complaint  6 month follow up  and Chronic systolic congestive heart failure    Subjective            Rubin Ratliff presents to [facility] CARDIOLOGY      Mr. Ratliff is here for follow-up evaluation management of dilated cardiomyopathy with chronic systolic heart failure, ventricular tachycardia, and dual-chamber ICD.  Since the last visit he has felt somewhat more short of breath, with fatigue.  Exertional chest discomfort which feels like heartburn but reproducible with exertion.  He has had some increased fluid accumulation and his diuretic therapy has been adjusted with as needed metolazone added to his Bumex.  He has advanced kidney disease with creatinine most recently 2.4.    PMH  Past Medical History:   Diagnosis Date    Asthma     Cardiomyopathy     CHF (congestive heart failure)     COPD (chronic obstructive pulmonary disease)     ESRD (end stage renal disease)     STAGE 3 B    GERD (gastroesophageal reflux disease)     Hypertension     ICD (implantable cardioverter-defibrillator) in place  \" NOT MRI COMPATIBLE\"  E142          SURGICALHX  Past Surgical History:   Procedure Laterality Date    CARDIAC CATHETERIZATION      HERNIA REPAIR      IHR    ICD GENERATOR REPLACEMENT N/A 8/1/2024    Procedure: Defibrillator Generator Change - DC [employer];  Surgeon: JAMES Greer MD;  Location: [facility] INVASIVE LOCATION;  Service: Cardiovascular;  Laterality: N/A;    INSERT / REPLACE / REMOVE PACEMAKER      PLACED 2012    LUMBAR DISCECTOMY      W/ FUSION    SINUS SURGERY      TOTAL HIP ARTHROPLASTY REVISION          SOC  Social History     Socioeconomic History    Marital status:    Tobacco Use    Smoking status: Never    Smokeless tobacco: Never   Vaping Use    Vaping status: Never Used   Substance and Sexual Activity    Alcohol use: Never    Drug use: Never    Sexual activity: Defer         FAMHX  Family History   Problem Relation Age of Onset    Heart disease Father " "    Heart failure Father     Malig Hyperthermia Neg Hx           ALLERGY  Allergies   Allergen Reactions    Penicillins Hives        MEDSCURRENT    Current Outpatient Medications:     albuterol sulfate  (90 Base) MCG/ACT inhaler, Inhale 2 puffs., Disp: , Rfl:     bumetanide (BUMEX) 1 MG tablet, Take 1 tablet by mouth Daily., Disp: , Rfl:     denosumab (PROLIA) 60 MG/ML solution prefilled syringe syringe, Inject 1 mL under the skin into the appropriate area as directed. Every 6 months, Disp: , Rfl:     enalapril (VASOTEC) 2.5 MG tablet, Take 1 tablet by mouth Daily., Disp: , Rfl:     fluticasone (FLONASE) 50 MCG/ACT nasal spray, Administer 2 sprays into the nostril(s) as directed by provider Daily., Disp: , Rfl:     Fluticasone-Umeclidin-Vilant (Trelegy Ellipta) 100-62.5-25 MCG/ACT inhaler, Inhale 1 puff Daily., Disp: , Rfl:     metOLazone (ZAROXOLYN) 2.5 MG tablet, As Needed., Disp: , Rfl:     metoprolol succinate XL (TOPROL-XL) 50 MG 24 hr tablet, Take 0.5 tablets by mouth Daily., Disp: , Rfl:     omeprazole (priLOSEC) 20 MG capsule, Take 1 capsule by mouth Daily., Disp: , Rfl:     spironolactone (ALDACTONE) 25 MG tablet, Take 1 tablet by mouth Daily., Disp: , Rfl:     tamsulosin (FLOMAX) 0.4 MG capsule 24 hr capsule, Take 1 capsule by mouth Daily., Disp: , Rfl:       Review of Systems   Constitutional: Positive for malaise/fatigue.   Cardiovascular:  Positive for dyspnea on exertion. Negative for chest pain and palpitations.   Respiratory:  Positive for shortness of breath.         Objective     BP 97/56   Pulse 89   Ht 177.8 cm (70\")   Wt 68.5 kg (151 lb)   BMI 21.67 kg/m²       General Appearance:   well developed  well nourished  HENT:   oropharynx moist  lips not cyanotic  Neck:  thyroid not enlarged  supple  Respiratory:  no respiratory distress  normal breath sounds  no rales  Cardiovascular:  no jugular venous distention  regular rhythm  apical impulse normal  S1 normal, S2 normal  no S3, no S4 "   no murmur  no rub, no thrill  carotid pulses normal; no bruit  pedal pulses normal  lower extremity edema: Mild  Musculoskeletal:  no clubbing of fingers.   normocephalic, head atraumatic  Skin:   Warm, dry  Psychiatric:  judgement and insight appropriate  normal mood and affect      Result Review :             Data reviewed : ICD remote monitoring reviewed, normal device function.   EKG August 2024 showed sinus rhythm with left bundle branch block, QRS duration 176 ms    Procedures               Assessment and Plan        ASSESSMENT:  Encounter Diagnoses   Name Primary?    Precordial pain Yes    Chronic systolic congestive heart failure     Dilated cardiomyopathy     ICD (implantable cardioverter-defibrillator), dual, in situ          PLAN:    1.  Dilated cardiomyopathy with chronic systolic congestive heart failure, class III symptomatology.  Continue current medical therapy.  Diuretic regimen appears appropriate.  He is over time having advancing symptoms from heart failure.  His baseline EKG shows a left bundle branch block with QRS duration 176 ms.  I had a lengthy discussion with him and his family today regarding possible benefits of upgrading his ICD to a biventricular ICD system.  This would be for the purpose of reducing symptoms, improving quality of life, decrease in hospitalization as part of his heart failure management.  We discussed the risk and benefits and alternatives of the procedure.  We used an industry supplied brochure and a [place] shared decision-making type discussion.  He is going to give this some thought.  2.  Ventricular tachycardia, stable, no significant recurrence on remote ICD monitoring.  3.  Precordial chest pain-he has not really complained about that much before.  Early on when he was first diagnosed with cardiomyopathy he had a negative cardiac catheterization but that has been some 12 years ago.  With his advanced kidney disease we will hold off on catheterization but we  will go ahead and schedule stress imaging to look for high risk ischemia.  We will discuss those results when available.    Will review the stress imaging results with the patient.  He will then decide whether he wants to consider an ICD upgrade to a CRT system.  We will schedule follow-up pending results of his stress test          Patient was given instructions and counseling regarding his condition or for health maintenance advice. Please see specific information pulled into the AVS if appropriate.             JAMES Greer MD  2/18/2025    14:23 EST

## 2025-02-19 NOTE — PROGRESS NOTES
Normal Dual Chamber ICD Device Interrogation and Device Testing.  Normal evaluation of device function and lead measurements.  No optimization was needed of parameters or maximization of device longevity.  Patient is on automated Home Remote Monitoring.

## 2025-03-04 ENCOUNTER — APPOINTMENT (OUTPATIENT)
Dept: GENERAL RADIOLOGY | Facility: HOSPITAL | Age: 85
DRG: 309 | End: 2025-03-04
Payer: MEDICARE

## 2025-03-04 ENCOUNTER — DOCUMENTATION (OUTPATIENT)
Dept: CARDIOLOGY | Facility: CLINIC | Age: 85
End: 2025-03-04
Payer: MEDICARE

## 2025-03-04 ENCOUNTER — HOSPITAL ENCOUNTER (INPATIENT)
Facility: HOSPITAL | Age: 85
LOS: 2 days | Discharge: HOME HEALTH | DRG: 309 | End: 2025-03-06
Attending: EMERGENCY MEDICINE | Admitting: FAMILY MEDICINE
Payer: MEDICARE

## 2025-03-04 DIAGNOSIS — N18.9 ACUTE RENAL FAILURE SUPERIMPOSED ON CHRONIC KIDNEY DISEASE, UNSPECIFIED ACUTE RENAL FAILURE TYPE, UNSPECIFIED CKD STAGE: ICD-10-CM

## 2025-03-04 DIAGNOSIS — N17.9 ACUTE RENAL FAILURE SUPERIMPOSED ON CHRONIC KIDNEY DISEASE, UNSPECIFIED ACUTE RENAL FAILURE TYPE, UNSPECIFIED CKD STAGE: ICD-10-CM

## 2025-03-04 DIAGNOSIS — I48.91 ATRIAL FIBRILLATION WITH RAPID VENTRICULAR RESPONSE: Primary | ICD-10-CM

## 2025-03-04 DIAGNOSIS — I50.23 ACUTE ON CHRONIC HFREF (HEART FAILURE WITH REDUCED EJECTION FRACTION): ICD-10-CM

## 2025-03-04 DIAGNOSIS — R26.2 DIFFICULTY IN WALKING: ICD-10-CM

## 2025-03-04 PROBLEM — I50.33 ACUTE ON CHRONIC HEART FAILURE WITH PRESERVED EJECTION FRACTION (HFPEF): Status: ACTIVE | Noted: 2025-03-04

## 2025-03-04 LAB
ALBUMIN SERPL-MCNC: 4.3 G/DL (ref 3.5–5.2)
ALBUMIN/GLOB SERPL: 1.1 G/DL
ALP SERPL-CCNC: 60 U/L (ref 39–117)
ALT SERPL W P-5'-P-CCNC: 7 U/L (ref 1–41)
ANION GAP SERPL CALCULATED.3IONS-SCNC: 15.9 MMOL/L (ref 5–15)
APTT PPP: 27.8 SECONDS (ref 78–95.9)
AST SERPL-CCNC: 18 U/L (ref 1–40)
BASOPHILS # BLD AUTO: 0.04 10*3/MM3 (ref 0–0.2)
BASOPHILS NFR BLD AUTO: 0.3 % (ref 0–1.5)
BILIRUB SERPL-MCNC: 0.9 MG/DL (ref 0–1.2)
BUN SERPL-MCNC: 79 MG/DL (ref 8–23)
BUN/CREAT SERPL: 30.5 (ref 7–25)
CALCIUM SPEC-SCNC: 9.5 MG/DL (ref 8.6–10.5)
CHLORIDE SERPL-SCNC: 87 MMOL/L (ref 98–107)
CO2 SERPL-SCNC: 25.1 MMOL/L (ref 22–29)
CREAT SERPL-MCNC: 2.59 MG/DL (ref 0.76–1.27)
DEPRECATED RDW RBC AUTO: 46.5 FL (ref 37–54)
EGFRCR SERPLBLD CKD-EPI 2021: 23.4 ML/MIN/1.73
EOSINOPHIL # BLD AUTO: 0.1 10*3/MM3 (ref 0–0.4)
EOSINOPHIL NFR BLD AUTO: 0.8 % (ref 0.3–6.2)
ERYTHROCYTE [DISTWIDTH] IN BLOOD BY AUTOMATED COUNT: 13.5 % (ref 12.3–15.4)
GEN 5 1HR TROPONIN T REFLEX: 50 NG/L
GLOBULIN UR ELPH-MCNC: 3.8 GM/DL
GLUCOSE SERPL-MCNC: 130 MG/DL (ref 65–99)
HCT VFR BLD AUTO: 35.6 % (ref 37.5–51)
HGB BLD-MCNC: 11.7 G/DL (ref 13–17.7)
HOLD SPECIMEN: NORMAL
HOLD SPECIMEN: NORMAL
IMM GRANULOCYTES # BLD AUTO: 0.08 10*3/MM3 (ref 0–0.05)
IMM GRANULOCYTES NFR BLD AUTO: 0.6 % (ref 0–0.5)
INR PPP: 1.04 (ref 0.86–1.15)
LYMPHOCYTES # BLD AUTO: 1.2 10*3/MM3 (ref 0.7–3.1)
LYMPHOCYTES NFR BLD AUTO: 9.3 % (ref 19.6–45.3)
MAGNESIUM SERPL-MCNC: 1.2 MG/DL (ref 1.6–2.4)
MCH RBC QN AUTO: 31.1 PG (ref 26.6–33)
MCHC RBC AUTO-ENTMCNC: 32.9 G/DL (ref 31.5–35.7)
MCV RBC AUTO: 94.7 FL (ref 79–97)
MONOCYTES # BLD AUTO: 0.85 10*3/MM3 (ref 0.1–0.9)
MONOCYTES NFR BLD AUTO: 6.6 % (ref 5–12)
NEUTROPHILS NFR BLD AUTO: 10.66 10*3/MM3 (ref 1.7–7)
NEUTROPHILS NFR BLD AUTO: 82.4 % (ref 42.7–76)
NRBC BLD AUTO-RTO: 0 /100 WBC (ref 0–0.2)
NT-PROBNP SERPL-MCNC: ABNORMAL PG/ML (ref 0–1800)
PLATELET # BLD AUTO: 225 10*3/MM3 (ref 140–450)
PMV BLD AUTO: 11.9 FL (ref 6–12)
POTASSIUM SERPL-SCNC: 4.2 MMOL/L (ref 3.5–5.2)
PROT SERPL-MCNC: 8.1 G/DL (ref 6–8.5)
PROTHROMBIN TIME: 14.1 SECONDS (ref 11.8–14.9)
QT INTERVAL: 402 MS
QTC INTERVAL: 575 MS
RBC # BLD AUTO: 3.76 10*6/MM3 (ref 4.14–5.8)
SODIUM SERPL-SCNC: 128 MMOL/L (ref 136–145)
TROPONIN T % DELTA: -4
TROPONIN T NUMERIC DELTA: -2 NG/L
TROPONIN T SERPL HS-MCNC: 52 NG/L
TSH SERPL DL<=0.05 MIU/L-ACNC: 2.99 UIU/ML (ref 0.27–4.2)
WBC NRBC COR # BLD AUTO: 12.93 10*3/MM3 (ref 3.4–10.8)
WHOLE BLOOD HOLD COAG: NORMAL
WHOLE BLOOD HOLD SPECIMEN: NORMAL

## 2025-03-04 PROCEDURE — 80053 COMPREHEN METABOLIC PANEL: CPT

## 2025-03-04 PROCEDURE — 25010000002 MAGNESIUM SULFATE 2 GM/50ML SOLUTION: Performed by: EMERGENCY MEDICINE

## 2025-03-04 PROCEDURE — 85025 COMPLETE CBC W/AUTO DIFF WBC: CPT

## 2025-03-04 PROCEDURE — 93005 ELECTROCARDIOGRAM TRACING: CPT

## 2025-03-04 PROCEDURE — 85730 THROMBOPLASTIN TIME PARTIAL: CPT | Performed by: INTERNAL MEDICINE

## 2025-03-04 PROCEDURE — 99223 1ST HOSP IP/OBS HIGH 75: CPT | Performed by: FAMILY MEDICINE

## 2025-03-04 PROCEDURE — 84443 ASSAY THYROID STIM HORMONE: CPT

## 2025-03-04 PROCEDURE — 71045 X-RAY EXAM CHEST 1 VIEW: CPT

## 2025-03-04 PROCEDURE — 83880 ASSAY OF NATRIURETIC PEPTIDE: CPT | Performed by: EMERGENCY MEDICINE

## 2025-03-04 PROCEDURE — 25010000002 HEPARIN (PORCINE) 25000-0.45 UT/250ML-% SOLUTION: Performed by: INTERNAL MEDICINE

## 2025-03-04 PROCEDURE — 93010 ELECTROCARDIOGRAM REPORT: CPT | Performed by: INTERNAL MEDICINE

## 2025-03-04 PROCEDURE — 84484 ASSAY OF TROPONIN QUANT: CPT

## 2025-03-04 PROCEDURE — 25010000002 DIGOXIN PER 500 MCG: Performed by: INTERNAL MEDICINE

## 2025-03-04 PROCEDURE — 93005 ELECTROCARDIOGRAM TRACING: CPT | Performed by: EMERGENCY MEDICINE

## 2025-03-04 PROCEDURE — 93010 ELECTROCARDIOGRAM REPORT: CPT | Performed by: SPECIALIST

## 2025-03-04 PROCEDURE — 25010000002 BUMETANIDE PER 0.5 MG: Performed by: EMERGENCY MEDICINE

## 2025-03-04 PROCEDURE — 85610 PROTHROMBIN TIME: CPT | Performed by: INTERNAL MEDICINE

## 2025-03-04 PROCEDURE — 83735 ASSAY OF MAGNESIUM: CPT

## 2025-03-04 PROCEDURE — 84484 ASSAY OF TROPONIN QUANT: CPT | Performed by: EMERGENCY MEDICINE

## 2025-03-04 PROCEDURE — 36415 COLL VENOUS BLD VENIPUNCTURE: CPT

## 2025-03-04 PROCEDURE — 99222 1ST HOSP IP/OBS MODERATE 55: CPT | Performed by: INTERNAL MEDICINE

## 2025-03-04 PROCEDURE — 99291 CRITICAL CARE FIRST HOUR: CPT

## 2025-03-04 PROCEDURE — 93005 ELECTROCARDIOGRAM TRACING: CPT | Performed by: FAMILY MEDICINE

## 2025-03-04 RX ORDER — BUMETANIDE 0.25 MG/ML
2 INJECTION, SOLUTION INTRAMUSCULAR; INTRAVENOUS ONCE
Status: COMPLETED | OUTPATIENT
Start: 2025-03-04 | End: 2025-03-04

## 2025-03-04 RX ORDER — SODIUM CHLORIDE 0.9 % (FLUSH) 0.9 %
10 SYRINGE (ML) INJECTION EVERY 12 HOURS SCHEDULED
Status: DISCONTINUED | OUTPATIENT
Start: 2025-03-04 | End: 2025-03-06 | Stop reason: HOSPADM

## 2025-03-04 RX ORDER — DIGOXIN 0.25 MG/ML
250 INJECTION INTRAMUSCULAR; INTRAVENOUS ONCE
Status: COMPLETED | OUTPATIENT
Start: 2025-03-05 | End: 2025-03-05

## 2025-03-04 RX ORDER — SODIUM CHLORIDE 0.9 % (FLUSH) 0.9 %
10 SYRINGE (ML) INJECTION AS NEEDED
Status: DISCONTINUED | OUTPATIENT
Start: 2025-03-04 | End: 2025-03-06 | Stop reason: HOSPADM

## 2025-03-04 RX ORDER — ONDANSETRON 2 MG/ML
4 INJECTION INTRAMUSCULAR; INTRAVENOUS EVERY 6 HOURS PRN
Status: DISCONTINUED | OUTPATIENT
Start: 2025-03-04 | End: 2025-03-06 | Stop reason: HOSPADM

## 2025-03-04 RX ORDER — AMOXICILLIN 250 MG
2 CAPSULE ORAL 2 TIMES DAILY PRN
Status: DISCONTINUED | OUTPATIENT
Start: 2025-03-04 | End: 2025-03-06 | Stop reason: HOSPADM

## 2025-03-04 RX ORDER — DIGOXIN 0.25 MG/ML
250 INJECTION INTRAMUSCULAR; INTRAVENOUS ONCE
Status: COMPLETED | OUTPATIENT
Start: 2025-03-04 | End: 2025-03-04

## 2025-03-04 RX ORDER — BISACODYL 5 MG/1
5 TABLET, DELAYED RELEASE ORAL DAILY PRN
Status: DISCONTINUED | OUTPATIENT
Start: 2025-03-04 | End: 2025-03-06 | Stop reason: HOSPADM

## 2025-03-04 RX ORDER — MAGNESIUM SULFATE HEPTAHYDRATE 40 MG/ML
2 INJECTION, SOLUTION INTRAVENOUS ONCE
Status: COMPLETED | OUTPATIENT
Start: 2025-03-04 | End: 2025-03-04

## 2025-03-04 RX ORDER — ALLOPURINOL 100 MG/1
100 TABLET ORAL 2 TIMES DAILY
COMMUNITY

## 2025-03-04 RX ORDER — SODIUM CHLORIDE 9 MG/ML
40 INJECTION, SOLUTION INTRAVENOUS AS NEEDED
Status: DISCONTINUED | OUTPATIENT
Start: 2025-03-04 | End: 2025-03-06 | Stop reason: HOSPADM

## 2025-03-04 RX ORDER — FLUTICASONE FUROATE, UMECLIDINIUM BROMIDE AND VILANTEROL TRIFENATATE 200; 62.5; 25 UG/1; UG/1; UG/1
1 POWDER RESPIRATORY (INHALATION)
COMMUNITY

## 2025-03-04 RX ORDER — NITROGLYCERIN 0.4 MG/1
0.4 TABLET SUBLINGUAL
Status: DISCONTINUED | OUTPATIENT
Start: 2025-03-04 | End: 2025-03-06 | Stop reason: HOSPADM

## 2025-03-04 RX ORDER — HEPARIN SODIUM 10000 [USP'U]/100ML
12 INJECTION, SOLUTION INTRAVENOUS
Status: DISPENSED | OUTPATIENT
Start: 2025-03-04 | End: 2025-03-05

## 2025-03-04 RX ORDER — BISACODYL 10 MG
10 SUPPOSITORY, RECTAL RECTAL DAILY PRN
Status: DISCONTINUED | OUTPATIENT
Start: 2025-03-04 | End: 2025-03-06 | Stop reason: HOSPADM

## 2025-03-04 RX ORDER — POLYETHYLENE GLYCOL 3350 17 G/17G
17 POWDER, FOR SOLUTION ORAL DAILY PRN
Status: DISCONTINUED | OUTPATIENT
Start: 2025-03-04 | End: 2025-03-06 | Stop reason: HOSPADM

## 2025-03-04 RX ADMIN — BUMETANIDE 2 MG: 0.25 INJECTION INTRAMUSCULAR; INTRAVENOUS at 21:31

## 2025-03-04 RX ADMIN — DIGOXIN 250 MCG: 0.25 INJECTION INTRAMUSCULAR; INTRAVENOUS at 21:52

## 2025-03-04 RX ADMIN — MAGNESIUM SULFATE HEPTAHYDRATE 2 G: 40 INJECTION, SOLUTION INTRAVENOUS at 18:24

## 2025-03-04 RX ADMIN — Medication 10 ML: at 22:09

## 2025-03-04 RX ADMIN — HEPARIN SODIUM 12 UNITS/KG/HR: 10000 INJECTION, SOLUTION INTRAVENOUS at 22:05

## 2025-03-04 NOTE — ED PROVIDER NOTES
"Time: 4:09 PM EST  Date of encounter:  3/4/2025  Independent Historian/Clinical History and Information was obtained by:   Patient    History is limited by: N/A    Chief Complaint: Shortness of breath, palpitations, atrial fibrillation.      History of Present Illness:  Patient is a 84 y.o. year old male who presents to the emergency department for evaluation of shortness of breath, palpitations and new diagnosis of atrial fibrillation.  Patient has a prior medical history consistent for CHF, COPD, ESRD, ICD with pacemaker, dilated cardiomyopathy.  Patient was seen by his PCP today who consulted his cardiologist Dr. Greer who reports that patient need to be sent to the ED for admission secondary to new A-fib.  Reports intermittent shortness of breath.  Denies known fevers.  Denies abdominal pain or chest pain.      Patient Care Team  Primary Care Provider: Asif Chavez MD    Past Medical History:     Allergies   Allergen Reactions    Penicillins Hives     Past Medical History:   Diagnosis Date    Asthma     Cardiomyopathy     CHF (congestive heart failure)     COPD (chronic obstructive pulmonary disease)     ESRD (end stage renal disease)     STAGE 3 B    GERD (gastroesophageal reflux disease)     Hypertension     ICD (implantable cardioverter-defibrillator) in place  \" NOT MRI COMPATIBLE\"  E142      Past Surgical History:   Procedure Laterality Date    CARDIAC CATHETERIZATION      HERNIA REPAIR      IHR    ICD GENERATOR REPLACEMENT N/A 8/1/2024    Procedure: Defibrillator Generator Change - DC -[employer];  Surgeon: JAMES Greer MD;  Location: [facility] INVASIVE LOCATION;  Service: Cardiovascular;  Laterality: N/A;    INSERT / REPLACE / REMOVE PACEMAKER      PLACED 2012    LUMBAR DISCECTOMY      W/ FUSION    SINUS SURGERY      TOTAL HIP ARTHROPLASTY REVISION       Family History   Problem Relation Age of Onset    Heart disease Father     Heart failure Father     Malig Hyperthermia Neg Hx  "       Home Medications:  Prior to Admission medications    Medication Sig Start Date End Date Taking? Authorizing Provider   albuterol sulfate  (90 Base) MCG/ACT inhaler Inhale 2 puffs.    Shorty Venegas MD   bumetanide (BUMEX) 1 MG tablet Take 1 tablet by mouth Daily.    Shorty Venegas MD   denosumab (PROLIA) 60 MG/ML solution prefilled syringe syringe Inject 1 mL under the skin into the appropriate area as directed. Every 6 months    Shorty Venegas MD   enalapril (VASOTEC) 2.5 MG tablet Take 1 tablet by mouth Daily. 7/1/24   Shorty Venegas MD   fluticasone (FLONASE) 50 MCG/ACT nasal spray Administer 2 sprays into the nostril(s) as directed by provider Daily.    Shorty Venegas MD   Fluticasone-Umeclidin-Vilant (Trelegy Ellipta) 100-62.5-25 MCG/ACT inhaler Inhale 1 puff Daily.    Shorty Venegas MD   metOLazone (ZAROXOLYN) 2.5 MG tablet As Needed. 1/31/22   Shorty Venegas MD   metoprolol succinate XL (TOPROL-XL) 50 MG 24 hr tablet Take 0.5 tablets by mouth Daily. 7/3/21   Shorty Venegas MD   omeprazole (priLOSEC) 20 MG capsule Take 1 capsule by mouth Daily.    Shorty Venegas MD   spironolactone (ALDACTONE) 25 MG tablet Take 1 tablet by mouth Daily. 7/4/21   Shorty Venegas MD   tamsulosin (FLOMAX) 0.4 MG capsule 24 hr capsule Take 1 capsule by mouth Daily.    Shorty Venegas MD        Social History:   Social History     Tobacco Use    Smoking status: Never    Smokeless tobacco: Never   Vaping Use    Vaping status: Never Used   Substance Use Topics    Alcohol use: Never    Drug use: Never         Review of Systems:  Review of Systems   Constitutional:  Negative for chills and fever.   HENT:  Negative for congestion, ear pain and sore throat.    Eyes:  Negative for pain.   Respiratory:  Positive for shortness of breath. Negative for cough and chest tightness.    Cardiovascular:  Positive for palpitations. Negative for chest pain.  "  Gastrointestinal:  Negative for abdominal pain, diarrhea, nausea and vomiting.   Genitourinary:  Negative for flank pain and hematuria.   Musculoskeletal:  Negative for joint swelling.   Skin:  Negative for pallor.   Neurological:  Negative for seizures and headaches.   All other systems reviewed and are negative.       Physical Exam:  /72 (Patient Position: Lying)   Pulse 112   Temp 98.4 °F (36.9 °C) (Oral)   Resp 20   Ht 177.8 cm (70\")   Wt 65.5 kg (144 lb 6.4 oz)   SpO2 93%   BMI 20.72 kg/m²     Physical Exam  Vitals and nursing note reviewed.   Constitutional:       General: He is not in acute distress.     Appearance: Normal appearance. He is not toxic-appearing.   HENT:      Head: Normocephalic and atraumatic.      Mouth/Throat:      Mouth: Mucous membranes are moist.   Eyes:      General: No scleral icterus.     Extraocular Movements: Extraocular movements intact.      Conjunctiva/sclera: Conjunctivae normal.   Cardiovascular:      Rate and Rhythm: Regular rhythm.      Pulses: Normal pulses.      Heart sounds: Normal heart sounds.   Pulmonary:      Effort: No respiratory distress.      Breath sounds: Rhonchi and rales present.   Abdominal:      General: Abdomen is flat. There is no distension.      Palpations: Abdomen is soft.      Tenderness: There is no abdominal tenderness.   Musculoskeletal:         General: Normal range of motion.      Cervical back: Normal range of motion and neck supple.   Skin:     General: Skin is warm and dry.      Coloration: Skin is not cyanotic.   Neurological:      Mental Status: He is alert and oriented to person, place, and time. Mental status is at baseline.   Psychiatric:         Attention and Perception: Attention and perception normal.         Mood and Affect: Mood normal.                    Medical Decision Making:      Comorbidities that affect care:    Chronic Kidney Disease, Congestive Heart Failure    External Notes reviewed:    Previous Clinic Note: " Cardiology visit for Cardiomyopathy      The following orders were placed and all results were independently analyzed by me:  Orders Placed This Encounter   Procedures    XR Chest 1 View    [place] Draw    Comprehensive Metabolic Panel    Magnesium    High Sensitivity Troponin T    TSH Rfx On Abnormal To Free T4    CBC Auto Differential    High Sensitivity Troponin T 1Hr    BNP    Protime-INR    aPTT    aPTT    Magnesium    CBC (No Diff)    Basic Metabolic Panel    NPO Diet NPO Type: Strict NPO    Undress & Gown    Continuous Pulse Oximetry    Strict Intake & Output    Notify Provider Platelet Count Less Than 85237    Notify Provider if PTT Not in Therapeutic Range After 24 Hours    Stop Infusion & Notify Provider if Bleeding Occurs    RN To Release aPTT Order 6 Hours After Heparin Bolus & 6 Hours After Any Heparin Rate Change    After 2 Consecutive Therapeutic aPTTs, Obtain aPTT Daily.  If a Rate Adjustment is Necessary, Resume Every 6 Hour aPTT Draws    Vital Signs Every 15 Minutes Until Stable, Then Every 4 Hours    Telemetry - Place Orders & Notify Provider of Results When Patient Experiences Acute Chest Pain, Dysrhythmia or Respiratory Distress    Notify Physician For Unrelieved Chest Pain    Intake & Output    Weigh Patient    Nurse to Order Troponin As Needed For Unrelieved or New Chest Pain    Saline Lock & Maintain IV Access    Vital Signs    Oral Care    Activity - Ad Mary    Code Status and Medical Interventions: CPR (Attempt to Resuscitate); Full Support    Hospitalist (on-call MD unless specified)    Cardiology (on-call MD unless specified)    Oxygen Therapy- Nasal Cannula; Titrate 1-6 LPM Per SpO2; 90 - 95%    Incentive Spirometry    Oxygen Therapy- Nasal Cannula; Titrate 1-6 LPM Per SpO2; 90 - 95%    ECG 12 Lead Rhythm Change    ECG 12 Lead Chest Pain    ECG 12 Lead Chest Pain    ECG 12 Lead Rhythm Change    Insert Peripheral IV    Insert Peripheral IV    Inpatient Admission    CBC & Differential     Green Top (Gel)    Lavender Top    Gold Top - SST    Light Blue Top       Medications Given in the Emergency Department:  Medications   sodium chloride 0.9 % flush 10 mL (has no administration in time range)   digoxin (LANOXIN) injection 250 mcg (has no administration in time range)   heparin 79836 units/250 mL (100 units/mL) in 0.45 % NaCl infusion (12 Units/kg/hr × 65.5 kg Intravenous Currently Infusing 3/4/25 2315)   heparin bolus from bag solution 3,300 Units (has no administration in time range)   heparin bolus from bag solution 1,600 Units (has no administration in time range)   nitroglycerin (NITROSTAT) SL tablet 0.4 mg (has no administration in time range)   sodium chloride 0.9 % flush 10 mL (10 mL Intravenous Given 3/4/25 2209)   sodium chloride 0.9 % flush 10 mL (has no administration in time range)   sodium chloride 0.9 % infusion 40 mL (has no administration in time range)   sennosides-docusate (PERICOLACE) 8.6-50 MG per tablet 2 tablet (has no administration in time range)     And   polyethylene glycol (MIRALAX) packet 17 g (has no administration in time range)     And   bisacodyl (DULCOLAX) EC tablet 5 mg (has no administration in time range)     And   bisacodyl (DULCOLAX) suppository 10 mg (has no administration in time range)   ondansetron (ZOFRAN) injection 4 mg (has no administration in time range)   magnesium sulfate 2g/50 mL (PREMIX) infusion (0 g Intravenous Stopped 3/4/25 2004)   bumetanide (BUMEX) injection 2 mg (2 mg Intravenous Given 3/4/25 2131)   digoxin (LANOXIN) injection 250 mcg (250 mcg Intravenous Given 3/4/25 2152)   heparin bolus from bag solution 3,900 Units (3,900 Units Intravenous Bolus from Bag 3/4/25 2203)        ED Course:    ED Course as of 03/05/25 0117   Tue Mar 04, 2025   8433 --- PROVIDER IN TRIAGE NOTE ---    The patient was evaluated by Norbert mckeon in triage. Orders were placed and the patient is currently awaiting disposition.  [CB]      ED Course User  Index  [CB] Norbert Tong, LULU     EKG: Atrial Fibrillation with a rate of 123 BPM  LBBB  IVCD   Left axis deviation        Labs:    Lab Results (last 24 hours)       Procedure Component Value Units Date/Time    CBC & Differential [[id number]]  (Abnormal) Collected: 03/04/25 1546    Specimen: Blood from Arm, Right Updated: 03/04/25 1625    Narrative:      The following orders were created for panel order CBC & Differential.  Procedure                               Abnormality         Status                     ---------                               -----------         ------                     CBC Auto Differential[[id number]]        Abnormal            Final result                 Please view results for these tests on the individual orders.    Comprehensive Metabolic Panel [[id number]]  (Abnormal) Collected: 03/04/25 1546    Specimen: Blood from Arm, Right Updated: 03/04/25 1647     Glucose 130 mg/dL      BUN 79 mg/dL      Creatinine 2.59 mg/dL      Sodium 128 mmol/L      Potassium 4.2 mmol/L      Chloride 87 mmol/L      CO2 25.1 mmol/L      Calcium 9.5 mg/dL      Total Protein 8.1 g/dL      Albumin 4.3 g/dL      ALT (SGPT) 7 U/L      AST (SGOT) 18 U/L      Alkaline Phosphatase 60 U/L      Total Bilirubin 0.9 mg/dL      Globulin 3.8 gm/dL      A/G Ratio 1.1 g/dL      BUN/Creatinine Ratio 30.5     Anion Gap 15.9 mmol/L      eGFR 23.4 mL/min/1.73     Narrative:      GFR Categories in Chronic Kidney Disease (CKD)      GFR Category          GFR (mL/min/1.73)    Interpretation  G1                     90 or greater         Normal or high (1)  G2                      60-89                Mild decrease (1)  G3a                   45-59                Mild to moderate decrease  G3b                   30-44                Moderate to severe decrease  G4                    15-29                Severe decrease  G5                    14 or less           Kidney failure          (1)In the absence of evidence of kidney  disease, neither GFR category G1 or G2 fulfill the criteria for CKD.    eGFR calculation 2021 CKD-EPI creatinine equation, which does not include race as a factor    Magnesium [[id number]]  (Abnormal) Collected: 03/04/25 1546    Specimen: Blood from Arm, Right Updated: 03/04/25 1647     Magnesium 1.2 mg/dL     High Sensitivity Troponin T [[id number]]  (Abnormal) Collected: 03/04/25 1546    Specimen: Blood from Arm, Right Updated: 03/04/25 1701     HS Troponin T 52 ng/L     Narrative:      High Sensitive Troponin T Reference Range:  <14.0 ng/L- Negative Female for AMI  <22.0 ng/L- Negative Male for AMI  >=14 - Abnormal Female indicating possible myocardial injury.  >=22 - Abnormal Male indicating possible myocardial injury.   Clinicians would have to utilize clinical acumen, EKG, Troponin, and serial changes to determine if it is an Acute Myocardial Infarction or myocardial injury due to an underlying chronic condition.         TSH Rfx On Abnormal To Free T4 [[id number]]  (Normal) Collected: 03/04/25 1546    Specimen: Blood from Arm, Right Updated: 03/04/25 1701     TSH 2.990 uIU/mL     CBC Auto Differential [[id number]]  (Abnormal) Collected: 03/04/25 1546    Specimen: Blood from Arm, Right Updated: 03/04/25 1625     WBC 12.93 10*3/mm3      RBC 3.76 10*6/mm3      Hemoglobin 11.7 g/dL      Hematocrit 35.6 %      MCV 94.7 fL      MCH 31.1 pg      MCHC 32.9 g/dL      RDW 13.5 %      RDW-SD 46.5 fl      MPV 11.9 fL      Platelets 225 10*3/mm3      Neutrophil % 82.4 %      Lymphocyte % 9.3 %      Monocyte % 6.6 %      Eosinophil % 0.8 %      Basophil % 0.3 %      Immature Grans % 0.6 %      Neutrophils, Absolute 10.66 10*3/mm3      Lymphocytes, Absolute 1.20 10*3/mm3      Monocytes, Absolute 0.85 10*3/mm3      Eosinophils, Absolute 0.10 10*3/mm3      Basophils, Absolute 0.04 10*3/mm3      Immature Grans, Absolute 0.08 10*3/mm3      nRBC 0.0 /100 WBC     Protime-INR [[id number]]  (Normal) Collected: 03/04/25 1546    Specimen:  Blood from Arm, Right Updated: 03/04/25 2021     Protime 14.1 Seconds      INR 1.04    Narrative:      Suggested Therapeutic Ranges For Oral Anticoagulant Therapy:  Level of Therapy                      INR Target Range  Standard Dose                            2.0-3.0  High Dose                                2.5-3.5  Patients not receiving anticoagulant  Therapy Normal Range                     0.86-1.15    High Sensitivity Troponin T 1Hr [[id number]]  (Abnormal) Collected: 03/04/25 1712    Specimen: Blood Updated: 03/04/25 1743     HS Troponin T 50 ng/L      Troponin T Numeric Delta -2 ng/L      Troponin T % Delta -4    Narrative:      High Sensitive Troponin T Reference Range:  <14.0 ng/L- Negative Female for AMI  <22.0 ng/L- Negative Male for AMI  >=14 - Abnormal Female indicating possible myocardial injury.  >=22 - Abnormal Male indicating possible myocardial injury.   Clinicians would have to utilize clinical acumen, EKG, Troponin, and serial changes to determine if it is an Acute Myocardial Infarction or myocardial injury due to an underlying chronic condition.         BNP [[id number]]  (Abnormal) Collected: 03/04/25 1712    Specimen: Blood Updated: 03/04/25 1819     proBNP 32,061.0 pg/mL     Narrative:      This assay is used as an aid in the diagnosis of individuals suspected of having heart failure. It can be used as an aid in the diagnosis of acute decompensated heart failure (ADHF) in patients presenting with signs and symptoms of ADHF to the emergency department (ED). In addition, NT-proBNP of <300 pg/mL indicates ADHF is not likely.    Age Range Result Interpretation  NT-proBNP Concentration (pg/mL:      <50             Positive            >450                   Gray                 300-450                    Negative             <300    50-75           Positive            >900                  Gray                300-900                  Negative            <300      >75             Positive             >1800                  Weinstein                300-1800                  Negative            <300    aPTT [[id number]]  (Abnormal) Collected: 03/04/25 2039    Specimen: Blood Updated: 03/04/25 2104     PTT 27.8 seconds              Imaging:    XR Chest 1 View    Result Date: 3/4/2025  XR CHEST 1 VW Date of Exam: 3/4/2025 4:18 PM EST Indication: Dysrhythmia Triage Protocol Comparison: None available. Findings: Heart size is mildly enlarged and the pulm vascularity is congested and indistinct. Increased groundglass and interstitial opacities are noted with a perihilar and bibasilar predominance. Pleural parenchymal changes noted asymmetrically at the right base. Left subclavian approach pacemaker in place.     Impression: Cardiomegaly and mild pulm vascular congestion, pulmonary edema. There is a small right-sided pleural effusion. Electronically Signed: Derrick Selby MD  3/4/2025 4:24 PM EST  Workstation ID: [id number]       Differential Diagnosis and Discussion:    Dyspnea: Differential diagnosis includes but is not limited to metabolic acidosis, neurological disorders, psychogenic, asthma, pneumothorax, upper airway obstruction, COPD, pneumonia, noncardiogenic pulmonary edema, interstitial lung disease, anemia, congestive heart failure, and pulmonary embolism    PROCEDURES:    Labs were collected in the emergency department and all labs were reviewed and interpreted by me.  X-ray were performed in the emergency department and all X-ray impressions were independently interpreted by me.  An EKG was performed and the EKG was interpreted by me.    ECG 12 Lead Rhythm Change   Preliminary Result   HEART PTTM=563  bpm   RR Wyghtppc=196  ms   SD Interval=66  ms   P Horizontal Axis=194  deg   P Front Axis=0  deg   QRSD Piccdryb=694  ms   QT Puiujvxb=570  ms   WLkK=927  ms   QRS Axis=-50  deg   T Wave Axis=102  deg   - ABNORMAL ECG -   Sinus tachycardia   Left bundle branch block   ST elevation secondary to IVCD   When compared  with ECG of 04-Mar-2025 23:25:37,   Significant change in rhythm   Date and Time of Study:2025-03-05 01:02:32      ECG 12 Lead Chest Pain   Preliminary Result   HEART ASAH=861  bpm   RR Nyayhsnw=245  ms   IL Psjelzlo=797  ms   P Horizontal Axis=180  deg   P Front Axis=191  deg   QRSD Rskmurig=004  ms   QT Ytphtloi=734  ms   KGeE=957  ms   QRS Axis=-59  deg   T Wave Axis=111  deg   - ABNORMAL ECG -   Sinus or ectopic atrial tachycardia   Atrial premature complex   Left bundle branch block   ST elevation secondary to IVCD   Date and Time of Study:2025-03-04 23:25:37      ECG 12 Lead Rhythm Change   Final Result   HEART UPSQ=047  bpm   RR Edxjageh=696  ms   IL Interval=  ms   P Horizontal Axis=  deg   P Front Axis=  deg   QRSD Ryygwbzq=969  ms   QT Mekqtrtw=443  ms   VUdI=691  ms   QRS Axis=-50  deg   T Wave Axis=110  deg   - ABNORMAL ECG -   Supraventricular tachycardia   V-paced complexes with some inhibition   When compared with ECG of 01-Aug-2024 06:59:50,   Significant change in rhythm: previously sinus   Electronically Signed By: Ludin Sierra ([facility]) 2025-03-04 21:03:22   Date and Time of Study:2025-03-04 15:29:20          Procedures    MDM     Amount and/or Complexity of Data Reviewed  Clinical lab tests: reviewed  Tests in the radiology section of CPT®: reviewed  Tests in the medicine section of CPT®: reviewed  Decide to obtain previous medical records or to obtain history from someone other than the patient: yes             Total Critical Care time of 50 minutes. Total critical care time documented does not include time spent on separately billed procedures for services of nurses or physician assistants. I personally saw and examined the patient. I have reviewed all diagnostic interpretations and treatment plans as written. I was present for the key portions of any procedures performed and the inclusive time noted in any critical care statement. Critical care time includes patient management by me, time spent at  the patients bedside,  time to review lab and imaging results, discussing patient care, documentation in the medical record, and time spent with family or caregiver.          Patient Care Considerations:    SEPSIS was considered but is NOT present in the emergency department as SIRS criteria is not present.      Consultants/Shared Management Plan:    Hospitalist: I have discussed the case with hospitalist who agrees to accept the patient for admission.  Consultant: I have discussed the case with Dr. Sierra who states administered Bumex IV    Social Determinants of Health:    Patient is unable to carry out activities of daily life. Escalation of care is necessary.       Disposition and Care Coordination:    Admit:   Through independent evaluation of the patient's history, physical, and imperical data, the patient meets criteria for inpatient admission to the hospital.        Final diagnoses:   Atrial fibrillation with rapid ventricular response        ED Disposition       ED Disposition   Decision to Admit    Condition   --    Comment   Level of Care: Telemetry [5]   Diagnosis: Atrial fibrillation with rapid ventricular response [670975]   Admitting Physician: JAMES PETERS [[id number]]   Certification: I Certify That Inpatient Hospital Services Are Medically Necessary For Greater Than 2 Midnights                 This medical record created using voice recognition software.             Sandeep Piper, DO  03/05/25 0117

## 2025-03-04 NOTE — PROGRESS NOTES
I spoke with the patient's primary care Dr. Chavez over the phone today.  Since our last office visit the patient has a pending stress test ordered.  However, he has become more short of breath with some volume retention.  He has seen the primary care a couple of times with adjustment in diuretic therapy.  The patient continues to have shortness of breath, and is tachycardic with EKG today reportedly showing atrial fibrillation with rapid ventricular rates most likely.  Based on the description after speaking with the primary care it seems that the patient is in decompensated heart failure most likely.  I recommend that he go to the emergency room at [facility] and will need hospital admission, possibly inotropic therapy and medication adjustments to treat acute decompensated heart failure.  In terms of possible atrial fibrillation rhythm control and beyond that possible upgrade of his device to a biventricular device that will depend on his clinical assessment and course once hospitalized.  Dr. Chavez was agreeable with this plan and was going to speak with the patient and family to make arrangements.    Electronically signed by Devante Greer MD, 03/04/25, 1:07 PM EST.

## 2025-03-05 LAB
ANION GAP SERPL CALCULATED.3IONS-SCNC: 16.1 MMOL/L (ref 5–15)
APTT PPP: 39.9 SECONDS (ref 78–95.9)
APTT PPP: 55.5 SECONDS (ref 78–95.9)
APTT PPP: 99.6 SECONDS (ref 78–95.9)
BUN SERPL-MCNC: 82 MG/DL (ref 8–23)
BUN/CREAT SERPL: 32.7 (ref 7–25)
CALCIUM SPEC-SCNC: 9.2 MG/DL (ref 8.6–10.5)
CHLORIDE SERPL-SCNC: 89 MMOL/L (ref 98–107)
CO2 SERPL-SCNC: 21.9 MMOL/L (ref 22–29)
CREAT SERPL-MCNC: 2.51 MG/DL (ref 0.76–1.27)
DEPRECATED RDW RBC AUTO: 44.2 FL (ref 37–54)
EGFRCR SERPLBLD CKD-EPI 2021: 24.3 ML/MIN/1.73
ERYTHROCYTE [DISTWIDTH] IN BLOOD BY AUTOMATED COUNT: 13 % (ref 12.3–15.4)
GLUCOSE SERPL-MCNC: 125 MG/DL (ref 65–99)
HCT VFR BLD AUTO: 32.8 % (ref 37.5–51)
HGB BLD-MCNC: 11.1 G/DL (ref 13–17.7)
MAGNESIUM SERPL-MCNC: 1.8 MG/DL (ref 1.6–2.4)
MCH RBC QN AUTO: 31.8 PG (ref 26.6–33)
MCHC RBC AUTO-ENTMCNC: 33.8 G/DL (ref 31.5–35.7)
MCV RBC AUTO: 94 FL (ref 79–97)
PLATELET # BLD AUTO: 198 10*3/MM3 (ref 140–450)
PMV BLD AUTO: 11.6 FL (ref 6–12)
POTASSIUM SERPL-SCNC: 3.9 MMOL/L (ref 3.5–5.2)
QT INTERVAL: 403 MS
QT INTERVAL: 423 MS
QT INTERVAL: 431 MS
QTC INTERVAL: 542 MS
QTC INTERVAL: 570 MS
QTC INTERVAL: 580 MS
RBC # BLD AUTO: 3.49 10*6/MM3 (ref 4.14–5.8)
SODIUM SERPL-SCNC: 127 MMOL/L (ref 136–145)
WBC NRBC COR # BLD AUTO: 12.5 10*3/MM3 (ref 3.4–10.8)

## 2025-03-05 PROCEDURE — 25010000002 DIGOXIN PER 500 MCG: Performed by: INTERNAL MEDICINE

## 2025-03-05 PROCEDURE — 80048 BASIC METABOLIC PNL TOTAL CA: CPT | Performed by: FAMILY MEDICINE

## 2025-03-05 PROCEDURE — 99232 SBSQ HOSP IP/OBS MODERATE 35: CPT | Performed by: STUDENT IN AN ORGANIZED HEALTH CARE EDUCATION/TRAINING PROGRAM

## 2025-03-05 PROCEDURE — 25010000002 MAGNESIUM SULFATE 2 GM/50ML SOLUTION: Performed by: STUDENT IN AN ORGANIZED HEALTH CARE EDUCATION/TRAINING PROGRAM

## 2025-03-05 PROCEDURE — 99232 SBSQ HOSP IP/OBS MODERATE 35: CPT | Performed by: INTERNAL MEDICINE

## 2025-03-05 PROCEDURE — 85027 COMPLETE CBC AUTOMATED: CPT | Performed by: FAMILY MEDICINE

## 2025-03-05 PROCEDURE — 85730 THROMBOPLASTIN TIME PARTIAL: CPT | Performed by: EMERGENCY MEDICINE

## 2025-03-05 PROCEDURE — 25010000002 HEPARIN (PORCINE) 25000-0.45 UT/250ML-% SOLUTION: Performed by: INTERNAL MEDICINE

## 2025-03-05 PROCEDURE — 85730 THROMBOPLASTIN TIME PARTIAL: CPT | Performed by: INTERNAL MEDICINE

## 2025-03-05 PROCEDURE — 93005 ELECTROCARDIOGRAM TRACING: CPT | Performed by: INTERNAL MEDICINE

## 2025-03-05 PROCEDURE — 25010000002 AMIODARONE IN DEXTROSE 5% 150-4.21 MG/100ML-% SOLUTION: Performed by: INTERNAL MEDICINE

## 2025-03-05 PROCEDURE — 93005 ELECTROCARDIOGRAM TRACING: CPT | Performed by: EMERGENCY MEDICINE

## 2025-03-05 PROCEDURE — 93010 ELECTROCARDIOGRAM REPORT: CPT | Performed by: SPECIALIST

## 2025-03-05 PROCEDURE — 85730 THROMBOPLASTIN TIME PARTIAL: CPT | Performed by: STUDENT IN AN ORGANIZED HEALTH CARE EDUCATION/TRAINING PROGRAM

## 2025-03-05 PROCEDURE — 83735 ASSAY OF MAGNESIUM: CPT | Performed by: FAMILY MEDICINE

## 2025-03-05 PROCEDURE — 25010000002 AMIODARONE IN DEXTROSE 5% 360-4.14 MG/200ML-% SOLUTION: Performed by: INTERNAL MEDICINE

## 2025-03-05 RX ORDER — AMIODARONE HYDROCHLORIDE 200 MG/1
200 TABLET ORAL EVERY 12 HOURS SCHEDULED
Status: DISCONTINUED | OUTPATIENT
Start: 2025-03-06 | End: 2025-03-06 | Stop reason: HOSPADM

## 2025-03-05 RX ORDER — AMIODARONE HYDROCHLORIDE 200 MG/1
200 TABLET ORAL ONCE
Status: COMPLETED | OUTPATIENT
Start: 2025-03-06 | End: 2025-03-06

## 2025-03-05 RX ORDER — AMIODARONE HYDROCHLORIDE 200 MG/1
200 TABLET ORAL EVERY 12 HOURS
Status: DISCONTINUED | OUTPATIENT
Start: 2025-03-16 | End: 2025-03-06 | Stop reason: HOSPADM

## 2025-03-05 RX ORDER — AMIODARONE HYDROCHLORIDE 200 MG/1
200 TABLET ORAL DAILY
Status: DISCONTINUED | OUTPATIENT
Start: 2025-03-31 | End: 2025-03-06 | Stop reason: HOSPADM

## 2025-03-05 RX ORDER — MAGNESIUM SULFATE HEPTAHYDRATE 40 MG/ML
2 INJECTION, SOLUTION INTRAVENOUS ONCE
Status: COMPLETED | OUTPATIENT
Start: 2025-03-05 | End: 2025-03-05

## 2025-03-05 RX ADMIN — MAGNESIUM SULFATE HEPTAHYDRATE 2 G: 40 INJECTION, SOLUTION INTRAVENOUS at 10:19

## 2025-03-05 RX ADMIN — AMIODARONE HYDROCHLORIDE 150 MG: 1.5 INJECTION, SOLUTION INTRAVENOUS at 14:42

## 2025-03-05 RX ADMIN — AMIODARONE HYDROCHLORIDE 1 MG/MIN: 1.8 INJECTION, SOLUTION INTRAVENOUS at 14:54

## 2025-03-05 RX ADMIN — AMIODARONE HYDROCHLORIDE 0.5 MG/MIN: 1.8 INJECTION, SOLUTION INTRAVENOUS at 21:12

## 2025-03-05 RX ADMIN — HEPARIN SODIUM 14 UNITS/KG/HR: 10000 INJECTION, SOLUTION INTRAVENOUS at 13:47

## 2025-03-05 RX ADMIN — Medication 10 ML: at 21:49

## 2025-03-05 RX ADMIN — Medication 10 ML: at 10:19

## 2025-03-05 RX ADMIN — DIGOXIN 250 MCG: 0.25 INJECTION INTRAMUSCULAR; INTRAVENOUS at 04:09

## 2025-03-05 RX ADMIN — APIXABAN 2.5 MG: 2.5 TABLET, FILM COATED ORAL at 21:49

## 2025-03-05 NOTE — ED NOTES
"Spoke to Dr. Sierra via chat also, to ensure he is aware of BP and wants to proceed as well as for guidance on what to do if pt's BP were to drop after admin. I asked if there was any medication he would like given if BP were to drop following admin of Bumex, Dr. Sierra advised \"as long as mean stay above 55 we can hold off for now\".   "

## 2025-03-05 NOTE — ED NOTES
Bumex 2mg IV ordered, parameters state hold for SBP less than 100, DBP less than 60. BP has been consecutively 90s/60s-90s/70s. Communicated this with Dr. Piper, stated Dr. Sierra was down a little bit ago to see the pt and verbalized wanting the Bumex given and to go ahead and administer.

## 2025-03-05 NOTE — CONSULTS
"Cardiology Consult Note  [facility] EMERGENCY ROOM          Patient Identification:  Rubin Ratliff      [id number]  84 y.o.        male  1940           Reason for Consultation: CHF    PCP: Asif Chavez MD    History of Present Illness:     Patient is a 84-year-old with a previous history of systolic congestive heart failure, chronic renal insufficiency, ICD, essential hypertension, who had been having issues throughout February of increased shortness of breath.  It also noted some weight gain of several pounds from his baseline.  He had been increased on his Bumex to twice a day and instructed to hold his metoprolol and enalapril.  His weight did respond with decreasing but he became more weak and fatigued and was still short of breath.  He was also noted to be hypotensive as well as tachycardic with his heart rate above 100.  The patient denies any anginal chest discomfort issues    Past History:  Past Medical History:   Diagnosis Date    Asthma     Cardiomyopathy     CHF (congestive heart failure)     COPD (chronic obstructive pulmonary disease)     ESRD (end stage renal disease)     STAGE 3 B    GERD (gastroesophageal reflux disease)     Hypertension     ICD (implantable cardioverter-defibrillator) in place  \" NOT MRI COMPATIBLE\"  E142      Past Surgical History:   Procedure Laterality Date    CARDIAC CATHETERIZATION      HERNIA REPAIR      IHR    ICD GENERATOR REPLACEMENT N/A 8/1/2024    Procedure: Defibrillator Generator Change - DC -[employer];  Surgeon: JAMES Greer MD;  Location: [facility] INVASIVE LOCATION;  Service: Cardiovascular;  Laterality: N/A;    INSERT / REPLACE / REMOVE PACEMAKER      PLACED 2012    LUMBAR DISCECTOMY      W/ FUSION    SINUS SURGERY      TOTAL HIP ARTHROPLASTY REVISION       Allergies   Allergen Reactions    Penicillins Hives     Social History     Socioeconomic History    Marital status:    Tobacco Use    Smoking status: Never    " Smokeless tobacco: Never   Vaping Use    Vaping status: Never Used   Substance and Sexual Activity    Alcohol use: Never    Drug use: Never    Sexual activity: Defer     Family History   Problem Relation Age of Onset    Heart disease Father     Heart failure Father     Malig Hyperthermia Neg Hx        Medications:  Prior to Admission medications    Medication Sig Start Date End Date Taking? Authorizing Provider   albuterol sulfate  (90 Base) MCG/ACT inhaler Inhale 2 puffs.    Shorty Venegas MD   bumetanide (BUMEX) 1 MG tablet Take 1 tablet by mouth Daily.    Shorty Venegas MD   denosumab (PROLIA) 60 MG/ML solution prefilled syringe syringe Inject 1 mL under the skin into the appropriate area as directed. Every 6 months    Shorty Venegas MD   enalapril (VASOTEC) 2.5 MG tablet Take 1 tablet by mouth Daily. 7/1/24   Shorty Venegas MD   fluticasone (FLONASE) 50 MCG/ACT nasal spray Administer 2 sprays into the nostril(s) as directed by provider Daily.    Shorty Venegas MD   Fluticasone-Umeclidin-Vilant (Trelegy Ellipta) 100-62.5-25 MCG/ACT inhaler Inhale 1 puff Daily.    Shorty Venegas MD   metOLazone (ZAROXOLYN) 2.5 MG tablet As Needed. 1/31/22   Shorty Venegas MD   metoprolol succinate XL (TOPROL-XL) 50 MG 24 hr tablet Take 0.5 tablets by mouth Daily. 7/3/21   Shorty Venegas MD   omeprazole (priLOSEC) 20 MG capsule Take 1 capsule by mouth Daily.    Shorty Venegas MD   spironolactone (ALDACTONE) 25 MG tablet Take 1 tablet by mouth Daily. 7/4/21   Shorty Venegas MD   tamsulosin (FLOMAX) 0.4 MG capsule 24 hr capsule Take 1 capsule by mouth Daily.    Shorty Venegas MD      Current medications:  bumetanide, 2 mg, Intravenous, Once      Current IV drips:       Review of Systems   Constitutional: Negative for chills, fever and weight loss.   HENT:  Negative for congestion and nosebleeds.    Cardiovascular:  Positive for leg swelling. Negative  "for orthopnea and paroxysmal nocturnal dyspnea.   Respiratory:  Positive for shortness of breath. Negative for cough.    Endocrine: Negative for cold intolerance and heat intolerance.   Skin:  Negative for rash.   Musculoskeletal:  Negative for back pain and muscle weakness.   Gastrointestinal:  Negative for abdominal pain, nausea and vomiting.   Genitourinary:  Negative for dysuria and nocturia.   Neurological:  Negative for dizziness and light-headedness.   Psychiatric/Behavioral:  Negative for altered mental status and hallucinations.          Physical Exam    BP 93/75 (Patient Position: Lying)   Pulse 106   Temp 98.4 °F (36.9 °C) (Oral)   Resp 21   Ht 177.8 cm (70\")   Wt 65.5 kg (144 lb 6.4 oz)   SpO2 95%   BMI 20.72 kg/m²  Body mass index is 20.72 kg/m².   Oxygen saturation   @FLOWAN(10::1)@ SpO2  Min: 95 %  Max: 99 %    General Appearance:   no acute distress  Alert and oriented x3  HENT:   lips not cyanotic  Atraumatic  Neck:  thyroid not enlarged  supple  Respiratory:  no respiratory distress  normal breath sounds  no rales  Cardiovascular:  no jugular venous distention  regular rhythm  apical impulse normal  S1 normal, S2 normal  no S3, no S4   no murmur  no rub, no thrill  no carotid bruit  pedal pulses normal  lower extremity edema: none    Gastrointestinal:   bowel sounds normal  non-tender  no hepatomegaly, no splenomegaly  Musculoskeletal:  no clubbing of fingers.   normocephalic, head atraumatic  Skin:   warm, dry  No rashes  Neuro/Psychiatric:  normal mood and affect  judgement and insight appropriate      Cardiographics:     ECG  (personally reviewed)    Telemetry:  (personally reviewed) paced rhythm rate in the 120s to 110s suspect may be a atrial flutter given how regular looks on telemetry but difficult to discern any P waves.          No results found for this or any previous visit.     Cardiolite (Tc-99m Sestamibi) stress test                  Lab Review:       CBC          8/1/2024    " "06:48 3/4/2025    15:46   CBC   WBC 9.57  12.93    RBC 4.29  3.76    Hemoglobin 12.7  11.7    Hematocrit 38.1  35.6    MCV 88.8  94.7    MCH 29.6  31.1    MCHC 33.3  32.9    RDW 12.5  13.5    Platelets 220  225        CMP          8/1/2024    06:48 3/4/2025    15:46   CMP   Glucose 103  130    BUN 49  79    Creatinine 2.06  2.59    EGFR 31.0  23.4    Sodium 135  128    Potassium 4.0  4.2    Chloride 95  87    Calcium 9.1  9.5    Total Protein  8.1    Albumin  4.3    Globulin  3.8    Total Bilirubin  0.9    Alkaline Phosphatase  60    AST (SGOT)  18    ALT (SGPT)  7    Albumin/Globulin Ratio  1.1    BUN/Creatinine Ratio 23.8  30.5    Anion Gap 14.4  15.9         CARDIAC LABS:      Lab 03/04/25 1712 03/04/25  1546   PROBNP 32,061.0*  --    HSTROP T 50* 52*      No results found for: \"DIGOXIN\"   Lab Results   Component Value Date    TSH 2.990 03/04/2025           Invalid input(s): \"LDLCALC\"  No results found for: \"POCTROP\"  No results found for: \"DDIMERQUAN\"  Lab Results   Component Value Date    MG 1.2 (L) 03/04/2025             CARDIAC LABS:      Lab 03/04/25 1712 03/04/25  1546   PROBNP 32,061.0*  --    HSTROP T 50* 52*        Imaging:  CXR    Impression: Impression: Cardiomegaly and mild pulm vascular congestion, pulmonary edema. There is a small right-sided pleural effusion         Assessment:    Atrial fibrillation with rapid ventricular response    Acute on chronic HFrEF (heart failure with reduced ejection fraction)      HFrEF acute on chronic with evidence of volume overload on exam not responsive to diuretics as outpatient patient's high blood pressure and renal function make treatment difficult it does appear that he may have had onset of new onset of atrial flutter which may be the causative factor for his decompensation.  Will go ahead and try to diurese tonight with IV Bumex 2 mg twice daily with strict I's and O's depending on how his blood pressure tolerates this may consider inotropic support in the " future as well    Atrial flutter/fib patient appears to be tachycardic in the low 100s this certainly may be the causative factor into why he had recent decompensation blood pressure has been on the lower side making rate control difficult especially for flutter we will go ahead and give a small dose of digoxin with his renal insufficiency tonight to try to help with rate control cardioversion could also be considered to try to maintain normal sinus rhythm and improve heart rate control as well.  Will place on heparin drip only short-term for anticoagulation    Plan:  1.  IV Bumex 2 mg twice daily  2.  Digoxin 0.25 IV x 1 and repeat 0.25 IV x 1 in 4 hours  3.  Strict I's and O's  4.  Will make n.p.o. in case CIPRIANO cardioversion considered  5.  Heparin GGT      Thank you for allowing us to share in Rubin [facility].            Ludin Sierra MD   3/4/2025    20:03 EST

## 2025-03-05 NOTE — PROGRESS NOTES
CARDIOLOGY  INPATIENT PROGRESS NOTE         [address] AMBULATORY SERVICES    3/5/2025      PATIENT IDENTIFICATION:   Name:  Rubin Ratliff      MRN:  [id number]     84 y.o.  male             Reason for visit: Acute on chronic systolic heart failure with new onset atrial flutter with rapid ventricular rate      SUBJECTIVE:    The patient remains in atrial flutter with rapid rates, heart rate about 105-125.  Blood pressure soft.  On heparin drip.  He is awake and alert.  OBJECTIVE:  Vitals:    03/05/25 0130 03/05/25 0205 03/05/25 0727 03/05/25 0750   BP: 90/71 92/70 (!) 78/49 90/52   BP Location: Right arm Right arm Left arm Right arm   Patient Position: Lying Lying Lying Lying   Pulse: 115 (!) 122 104    Resp: 20 20 20    Temp:  98.5 °F (36.9 °C) 98.7 °F (37.1 °C)    TempSrc:  Oral Oral    SpO2: 93% 98% 97%    Weight:       Height:               Body mass index is 20.72 kg/m².    Intake/Output Summary (Last 24 hours) at 3/5/2025 0804  Last data filed at 3/5/2025 0603  Gross per 24 hour   Intake 50 ml   Output 100 ml   Net -50 ml       Telemetry: Atrial flutter with left bundle branch block and rapid rates    Review of Systems      Exam:  General appearance no acute distress    well nourished   HEENT sclerae non-icteric    lips not cyanotic   Respiratory rate and depth normal    normal breath sounds    no rales, no wheeze   Cardiovascular JVP normal    regular rhythm, rapid rate    S1 normal, S2 normal    no S3, no S4    no murmur    lower extremity edema:none   Abdominal bowel sounds normal    abdomen soft, non-tender    no hepatosplenomegaly   Neuro-psych oriented to person, place, time    cooperative     Allergies   Allergen Reactions    Penicillins Hives     Scheduled meds:  sodium chloride, 10 mL, Intravenous, Q12H      IV meds:                      heparin, 12 Units/kg/hr, Last Rate: 12 Units/kg/hr (03/05/25 9523)      Data Review:  Results from last 7 days   Lab Units 03/05/25  7425  "03/04/25  1546   SODIUM mmol/L 127* 128*   BUN mg/dL 82* 79*   CREATININE mg/dL 2.51* 2.59*   GLUCOSE mg/dL 125* 130*             Estimated Creatinine Clearance: 19.6 mL/min (A) (by C-G formula based on SCr of 2.51 mg/dL (H)).  Results from last 7 days   Lab Units 03/05/25  0457 03/04/25  1546   WBC 10*3/mm3 12.50* 12.93*   HEMOGLOBIN g/dL 11.1* 11.7*     Results from last 7 days   Lab Units 03/04/25  1546   INR  1.04     Results from last 7 days   Lab Units 03/04/25  1546   ALT (SGPT) U/L 7   AST (SGOT) U/L 18     No results found for: \"DIGOXIN\"   Lab Results   Component Value Date    TSH 2.990 03/04/2025           Invalid input(s): \"LDLCALC\"            Imaging (last 24 hr):   Imaging Results (Last 24 Hours)       Procedure Component Value Units Date/Time    XR Chest 1 View [[id number]] Collected: 03/04/25 1622     Updated: 03/04/25 1626    Narrative:      XR CHEST 1 VW    Date of Exam: 3/4/2025 4:18 PM EST    Indication: Dysrhythmia Triage Protocol    Comparison: None available.    Findings:  Heart size is mildly enlarged and the pulm vascularity is congested and indistinct. Increased groundglass and interstitial opacities are noted with a perihilar and bibasilar predominance. Pleural parenchymal changes noted asymmetrically at the right   base. Left subclavian approach pacemaker in place.      Impression:      Impression:  Cardiomegaly and mild pulm vascular congestion, pulmonary edema. There is a small right-sided pleural effusion.      Electronically Signed: Derrick Selby MD    3/4/2025 4:24 PM EST    Workstation ID: [id number]              ASSESSMENT:     Atrial fibrillation with rapid ventricular response    Acute on chronic HFrEF (heart failure with reduced ejection fraction)        PLAN:  1.  Start IV amiodarone, continue heparin drip  2.  Plan CIPRIANO cardioversion this afternoon  3.  Risk and benefits were discussed with the patient, he is agreeable to proceed  4.  Blood pressure is always been soft so " titration of standard guideline medical therapies for heart failure has been difficult over the years.  We will try to optimize the meds as best we can.  In the relative short-term upgrading his ICD to a biventricular system would stand a good chance of improving his symptoms as well.  I discussed with the patient and family and we will take his case a step at a time here.                Devante Greer MD  3/5/2025    08:04 EST       I came over after clinic and reviewed the telemetry tracings.  It appears currently the patient is back in sinus rhythm with a long first-degree AV block, and left bundle branch block.  This is most notable when he has a PVC clearly you can see a single P wave.  Otherwise it has been somewhat difficult to see if he is in sinus rhythm or not.  Canceling CIPRIANO cardioversion because of this.  I am going to go ahead and put him on IV amiodarone for loading to try and prevent recurrent atrial dysrhythmias which caused significant decline in his heart failure status recently.  I discussed with the patient and family.  Will go ahead and order a diet.  Will keep overnight for telemetry monitoring with the early amiodarone loading.  Will change the heparin over to Eliquis orally.    Electronically signed by Devante Greer MD, 03/05/25, 1:08 PM EST.

## 2025-03-05 NOTE — H&P
[facility]   HISTORY AND PHYSICAL    Patient Name: Rubin Ratliff  : 1940  MRN: [id number]  Primary Care Physician:  Asif Chavez MD  Date of admission: 3/4/2025    Subjective   Subjective     Chief Complaint: Shortness of breath, lightheadedness, palpitations    HPI:    Rubin Ratliff is a 84 y.o. male with past medical history of hypertension, COPD, CHF with reduced ejection fraction of 25%, dilated cardiomyopathy, CKD 3B, status post AICD with past medical, COPD, and GERD was sent to the ED by cardiology for management of new onset A-fib and CIPRIANO.  Patient states that for the last week or so he has been having dyspnea on exertion with intermittent palpitations.  Patient went to PCP a few days ago where he had his Bumex increased to twice daily which improved his breathing somewhat but then became lightheaded upon standing and worsening palpitations.  Patient then went to PCPs office where cardiology was there as well and was found to be hypotensive with EKG showing new onset A-fib with RVR so he was sent to the ED for further management.  In the ED patient was tachycardic with soft blood pressures on arrival.  EKG showed A-fib RVR, with left bundle branch block.  Labs showed mild leukocytosis and anemia with hypomagnesemia and elevated proBNP.  Chest x-ray showed cardiomegaly and mild pulmonary vascular congestion and pulmonary edema.  When seen patient was resting comfortably when asked she denied any recent fevers, chills, headaches, focal weakness, abdominal pain, nausea, vomiting, diarrhea, constipation, dysuria, hematuria, hematochezia, melena, or anxiety.  Patient admitted for further evaluation and treatment.    Review of Systems   All systems were reviewed and negative except for: As per HPI    Personal History     Past Medical History:   Diagnosis Date    Asthma     Cardiomyopathy     CHF (congestive heart failure)     COPD (chronic obstructive pulmonary disease)     ESRD (end  "stage renal disease)     STAGE 3 B    GERD (gastroesophageal reflux disease)     Hypertension     ICD (implantable cardioverter-defibrillator) in place  \" NOT MRI COMPATIBLE\"  E142        Past Surgical History:   Procedure Laterality Date    CARDIAC CATHETERIZATION      HERNIA REPAIR      IHR    ICD GENERATOR REPLACEMENT N/A 8/1/2024    Procedure: Defibrillator Generator Change - DC -[employer];  Surgeon: JAMES Greer MD;  Location: [facility] INVASIVE LOCATION;  Service: Cardiovascular;  Laterality: N/A;    INSERT / REPLACE / REMOVE PACEMAKER      PLACED 2012    LUMBAR DISCECTOMY      W/ FUSION    SINUS SURGERY      TOTAL HIP ARTHROPLASTY REVISION         Family History: family history includes Heart disease in his father; Heart failure in his father. Otherwise pertinent FHx was reviewed and not pertinent to current issue.    Social History:  reports that he has never smoked. He has never used smokeless tobacco. He reports that he does not drink alcohol and does not use drugs.    Home Medications:  albuterol sulfate HFA, bumetanide, denosumab, enalapril, fluticasone, metOLazone, metoprolol succinate XL, omeprazole, spironolactone, and tamsulosin      Allergies:  Allergies   Allergen Reactions    Penicillins Hives       Objective   Objective     Vitals:   Temp:  [98.4 °F (36.9 °C)-98.6 °F (37 °C)] 98.4 °F (36.9 °C)  Heart Rate:  [106-125] 106  Resp:  [16-21] 21  BP: (90-98)/(61-76) 93/75  Physical Exam    Constitutional: Awake, alert   Eyes: PERRLA, sclerae anicteric, no conjunctival injection   HENT: NCAT, mucous membranes moist   Neck: Supple, no thyromegaly, no lymphadenopathy, trachea midline   Respiratory: Clear to auscultation bilaterally, nonlabored respirations    Cardiovascular: Tachycardia, irregular rhythm, systolic murmur, rubs, or gallops, palpable pedal pulses bilaterally   Gastrointestinal: Positive bowel sounds, soft, nontender, nondistended   Musculoskeletal: Lower extremity edema, no " clubbing or cyanosis to extremities   Psychiatric: Appropriate affect, cooperative   Neurologic: Oriented x 3, strength symmetric in all extremities, Cranial Nerves grossly intact to confrontation, speech clear   Skin: No rashes     Result Review    Result Review:  I have personally reviewed the results from the time of this admission to 3/4/2025 20:25 EST and agree with these findings:  [x]  Laboratory list / accordion  []  Microbiology  [x]  Radiology  [x]  EKG/Telemetry   []  Cardiology/Vascular   []  Pathology  []  Old records  []  Other:  Most notable findings include: Rubin Ratliff is a 84 y.o. male with past medical history of hypertension, COPD, CHF with reduced ejection fraction of 25%, dilated cardiomyopathy, CKD 3B, status post AICD with past medical, COPD, and GERD was sent to the ED by cardiology for management of new onset A-fib and CIPRIANO.       Assessment & Plan   Assessment / Plan     Brief Patient Summary:  Rubin Ratliff is a 84 y.o. male with past medical history of hypertension, COPD, CHF with reduced ejection fraction of 25%, dilated cardiomyopathy, CKD 3B, status post AICD with past medical, COPD, and GERD was sent to the ED by cardiology for management of new onset A-fib and CIPRIANO.      Active Hospital Problems:  Active Hospital Problems    Diagnosis     **Atrial fibrillation with rapid ventricular response     Acute on chronic HFrEF (heart failure with reduced ejection fraction)     ICD (implantable cardioverter-defibrillator) battery depletion      Plan:     A-fib with RVR  -Admit to telemetry  -New onset  -History of congestive heart failure with a EF of 25%  -Soft blood pressures  -Digoxin  -Heparin drip  -TSH  -Cardiology consulted  -CIPRIANO in the a.m.  -N.p.o. after midnight  -supportive care    CHF  BPH  CKD    GI ppx  DVT ppx      VTE Prophylaxis:  Pharmacologic VTE prophylaxis orders are present.        CODE STATUS:       Admission Status:  I believe this patient meets inpatient  status.      Electronically signed by Keron Myers MD, 03/04/25, 8:25 PM EST.

## 2025-03-05 NOTE — PROGRESS NOTES
[facility]   Hospitalist Progress Note  Date: 3/5/2025  Patient Name: Rubin Ratliff  : 1940  MRN: [id number]  Date of admission: 3/4/2025  Room/Bed: [facility]      Subjective   Subjective     Chief Complaint: Shortness of breath, lightheadedness, palpitations     Summary:Rubin Ratliff is a 84 y.o. male with past medical history of hypertension, COPD, CHF with reduced ejection fraction of 25%, dilated cardiomyopathy, CKD 3B, status post AICD with past medical, COPD, and GERD was sent to the ED by cardiology for management of new onset A-fib and CIPRIANO.  Patient states that for the last week or so he has been having dyspnea on exertion with intermittent palpitations.  Patient went to PCP a few days ago where he had his Bumex increased to twice daily which improved his breathing somewhat but then became lightheaded upon standing and worsening palpitations.  Patient then went to PCPs office where cardiology was there as well and was found to be hypotensive with EKG showing new onset A-fib with RVR so he was sent to the ED for further management.  In the ED patient was tachycardic with soft blood pressures on arrival.  EKG showed A-fib RVR, with left bundle branch block.  Labs showed mild leukocytosis and anemia with hypomagnesemia and elevated proBNP.  Chest x-ray showed cardiomegaly and mild pulmonary vascular congestion and pulmonary edema.  When seen patient was resting comfortably when asked she denied any recent fevers, chills, headaches, focal weakness, abdominal pain, nausea, vomiting, diarrhea, constipation, dysuria, hematuria, hematochezia, melena, or anxiety.  Patient admitted for further evaluation and treatment.  Cardiology on board.    Interval Followup: Patient was laying in bed comfortably during my evaluation.  Stated he feels better.  Telemetry showed normal sinus rhythm with HR in 90s.  Initially cardiologist planned for CIPRIANO cardioversion later today which was held given he had converted  to sinus rhythm.  Started on amiodarone drip.  Vital stable.  Closely monitoring blood pressure and heart rate.  No active complaints.  Family at bedside, updated.    Review of Systems    All systems reviewed and negative except for what is outlined above.      Objective   Objective     Vitals:   Temp:  [98.4 °F (36.9 °C)-98.7 °F (37.1 °C)] 98.7 °F (37.1 °C)  Heart Rate:  [104-125] 104  Resp:  [16-24] 20  BP: ()/(49-76) 90/52    Physical Exam   General: Awake, alert, NAD  Cardiovascular: RRR, no murmurs   Pulmonary: CTA bilaterally; no wheezes; no conversational dyspnea  Gastrointestinal: S/ND/NT, +BS  Neuro: Alert, awake, oriented x 3; speech clear; no tremor      Result Review    Result Review:  I have personally reviewed these results:  [x]  Laboratory      Lab 03/05/25 0457 03/04/25 2039 03/04/25  1546   WBC 12.50*  --  12.93*   HEMOGLOBIN 11.1*  --  11.7*   HEMATOCRIT 32.8*  --  35.6*   PLATELETS 198  --  225   NEUTROS ABS  --   --  10.66*   IMMATURE GRANS (ABS)  --   --  0.08*   LYMPHS ABS  --   --  1.20   MONOS ABS  --   --  0.85   EOS ABS  --   --  0.10   MCV 94.0  --  94.7   PROTIME  --   --  14.1   APTT 39.9* 27.8*  --          Lab 03/05/25 0457 03/04/25  1546   SODIUM 127* 128*   POTASSIUM 3.9 4.2   CHLORIDE 89* 87*   CO2 21.9* 25.1   ANION GAP 16.1* 15.9*   BUN 82* 79*   CREATININE 2.51* 2.59*   EGFR 24.3* 23.4*   GLUCOSE 125* 130*   CALCIUM 9.2 9.5   MAGNESIUM 1.8 1.2*   TSH  --  2.990         Lab 03/04/25  1546   TOTAL PROTEIN 8.1   ALBUMIN 4.3   GLOBULIN 3.8   ALT (SGPT) 7   AST (SGOT) 18   BILIRUBIN 0.9   ALK PHOS 60         Lab 03/04/25  1712 03/04/25  1546   PROBNP 32,061.0*  --    HSTROP T 50* 52*   PROTIME  --  14.1   INR  --  1.04                 Brief Urine Lab Results       None          [x]  Microbiology   Microbiology Results (last 10 days)       ** No results found for the last 240 hours. **          [x]  Radiology  XR Chest 1 View    Result Date: 3/4/2025  Impression: Cardiomegaly  and mild pulm vascular congestion, pulmonary edema. There is a small right-sided pleural effusion. Electronically Signed: Derrick Selby MD  3/4/2025 4:24 PM EST  Workstation ID: [id number]   []  EKG/Telemetry   []  Cardiology/Vascular   []  Pathology  []  Old records  []  Other:    Assessment & Plan   Assessment / Plan     Assessment:  Atrial fibrillation with RVR, improving  Elevated troponin, likely type II MI from above  Hyponatremia  History of HFrEF s/p AICD  Hypertension  COPD  Dilated cardiomyopathy  GERD    Plan:  Patient currently being managed in medicine service.  Found to have atrial fibrillation with RVR.  Cardiology on board, initially plan was for CIPRIANO cardioversion later today.  Eventually, patient transition to normal sinus rhythm after which CIPRIANO cardioversion was held.  Started on amiodarone drip to try and prevent recurrent atrial dysrhythmias which might have been causing significant decline in his heart failure status as per cardiologist.  Monitoring blood pressure and heart rate closely.  Heparin drip transition to Eliquis oral twice daily.  Continue to monitor on cardiac telemetry.  Appreciate further input by cardiologist.  Labs in AM.  Holding home enalapril and Aldactone in the setting of low normal blood pressure.  Also holding metolazone.  Monitor vitals.  Continue rest of current management.  Clinical course to determine disposition.     Discussed with RN.    VTE Prophylaxis:  Pharmacologic VTE prophylaxis orders are present.        CODE STATUS:   Level Of Support Discussed With: Patient  Code Status (Patient has no pulse and is not breathing): CPR (Attempt to Resuscitate)  Medical Interventions (Patient has pulse or is breathing): Full Support      Electronically signed by Mansoor Chan MD, 03/05/25, 8:10 AM EST.

## 2025-03-06 ENCOUNTER — READMISSION MANAGEMENT (OUTPATIENT)
Dept: CALL CENTER | Facility: HOSPITAL | Age: 85
End: 2025-03-06
Payer: MEDICARE

## 2025-03-06 VITALS
WEIGHT: 144.4 LBS | RESPIRATION RATE: 16 BRPM | SYSTOLIC BLOOD PRESSURE: 111 MMHG | HEIGHT: 70 IN | OXYGEN SATURATION: 96 % | DIASTOLIC BLOOD PRESSURE: 75 MMHG | BODY MASS INDEX: 20.67 KG/M2 | TEMPERATURE: 97.5 F | HEART RATE: 97 BPM

## 2025-03-06 PROBLEM — N18.9 ACUTE RENAL FAILURE SUPERIMPOSED ON CHRONIC KIDNEY DISEASE: Status: ACTIVE | Noted: 2025-03-06

## 2025-03-06 PROBLEM — N18.9 HISTORY OF ANEMIA DUE TO CHRONIC KIDNEY DISEASE: Status: ACTIVE | Noted: 2025-03-06

## 2025-03-06 PROBLEM — Z86.2 HISTORY OF ANEMIA DUE TO CHRONIC KIDNEY DISEASE: Status: ACTIVE | Noted: 2025-03-06

## 2025-03-06 PROBLEM — N17.9 ACUTE RENAL FAILURE SUPERIMPOSED ON CHRONIC KIDNEY DISEASE: Status: ACTIVE | Noted: 2025-03-06

## 2025-03-06 PROBLEM — E87.1 HYPONATREMIA: Status: ACTIVE | Noted: 2025-03-06

## 2025-03-06 PROBLEM — I50.22 CHRONIC SYSTOLIC CONGESTIVE HEART FAILURE, NYHA CLASS 3: Status: ACTIVE | Noted: 2025-03-04

## 2025-03-06 LAB
ANION GAP SERPL CALCULATED.3IONS-SCNC: 16.2 MMOL/L (ref 5–15)
BASOPHILS # BLD AUTO: 0.05 10*3/MM3 (ref 0–0.2)
BASOPHILS NFR BLD AUTO: 0.4 % (ref 0–1.5)
BILIRUB UR QL STRIP: NEGATIVE
BUN SERPL-MCNC: 85 MG/DL (ref 8–23)
BUN/CREAT SERPL: 28.8 (ref 7–25)
CALCIUM SPEC-SCNC: 9 MG/DL (ref 8.6–10.5)
CHLORIDE SERPL-SCNC: 88 MMOL/L (ref 98–107)
CLARITY UR: CLEAR
CO2 SERPL-SCNC: 21.8 MMOL/L (ref 22–29)
COLOR UR: YELLOW
CREAT SERPL-MCNC: 2.95 MG/DL (ref 0.76–1.27)
CREAT UR-MCNC: 124.3 MG/DL
DEPRECATED RDW RBC AUTO: 45 FL (ref 37–54)
EGFRCR SERPLBLD CKD-EPI 2021: 20 ML/MIN/1.73
EOSINOPHIL # BLD AUTO: 0.09 10*3/MM3 (ref 0–0.4)
EOSINOPHIL NFR BLD AUTO: 0.7 % (ref 0.3–6.2)
ERYTHROCYTE [DISTWIDTH] IN BLOOD BY AUTOMATED COUNT: 13.1 % (ref 12.3–15.4)
GLUCOSE SERPL-MCNC: 137 MG/DL (ref 65–99)
GLUCOSE UR STRIP-MCNC: NEGATIVE MG/DL
HCT VFR BLD AUTO: 32.1 % (ref 37.5–51)
HGB BLD-MCNC: 10.6 G/DL (ref 13–17.7)
HGB UR QL STRIP.AUTO: NEGATIVE
IMM GRANULOCYTES # BLD AUTO: 0.09 10*3/MM3 (ref 0–0.05)
IMM GRANULOCYTES NFR BLD AUTO: 0.7 % (ref 0–0.5)
IRON 24H UR-MRATE: 27 MCG/DL (ref 59–158)
IRON SATN MFR SERPL: 11 % (ref 20–50)
KETONES UR QL STRIP: NEGATIVE
LEUKOCYTE ESTERASE UR QL STRIP.AUTO: NEGATIVE
LYMPHOCYTES # BLD AUTO: 0.85 10*3/MM3 (ref 0.7–3.1)
LYMPHOCYTES NFR BLD AUTO: 6.7 % (ref 19.6–45.3)
MAGNESIUM SERPL-MCNC: 2.4 MG/DL (ref 1.6–2.4)
MCH RBC QN AUTO: 31.1 PG (ref 26.6–33)
MCHC RBC AUTO-ENTMCNC: 33 G/DL (ref 31.5–35.7)
MCV RBC AUTO: 94.1 FL (ref 79–97)
MONOCYTES # BLD AUTO: 0.99 10*3/MM3 (ref 0.1–0.9)
MONOCYTES NFR BLD AUTO: 7.8 % (ref 5–12)
NEUTROPHILS NFR BLD AUTO: 10.55 10*3/MM3 (ref 1.7–7)
NEUTROPHILS NFR BLD AUTO: 83.7 % (ref 42.7–76)
NITRITE UR QL STRIP: NEGATIVE
NRBC BLD AUTO-RTO: 0 /100 WBC (ref 0–0.2)
NT-PROBNP SERPL-MCNC: ABNORMAL PG/ML (ref 0–1800)
OSMOLALITY UR: 426 MOSM/KG (ref 50–1400)
PH UR STRIP.AUTO: <=5 [PH] (ref 5–8)
PHOSPHATE SERPL-MCNC: 3.6 MG/DL (ref 2.5–4.5)
PLATELET # BLD AUTO: 190 10*3/MM3 (ref 140–450)
PMV BLD AUTO: 12 FL (ref 6–12)
POTASSIUM SERPL-SCNC: 3.8 MMOL/L (ref 3.5–5.2)
PROT ?TM UR-MCNC: 17.8 MG/DL
PROT UR QL STRIP: ABNORMAL
PROT/CREAT UR: 0.14 MG/G{CREAT}
QT INTERVAL: 441 MS
QTC INTERVAL: 514 MS
RBC # BLD AUTO: 3.41 10*6/MM3 (ref 4.14–5.8)
SODIUM SERPL-SCNC: 126 MMOL/L (ref 136–145)
SP GR UR STRIP: 1.02 (ref 1–1.03)
TIBC SERPL-MCNC: 255 MCG/DL (ref 298–536)
TRANSFERRIN SERPL-MCNC: 171 MG/DL (ref 200–360)
UROBILINOGEN UR QL STRIP: ABNORMAL
WBC NRBC COR # BLD AUTO: 12.62 10*3/MM3 (ref 3.4–10.8)

## 2025-03-06 PROCEDURE — 83735 ASSAY OF MAGNESIUM: CPT | Performed by: STUDENT IN AN ORGANIZED HEALTH CARE EDUCATION/TRAINING PROGRAM

## 2025-03-06 PROCEDURE — 84466 ASSAY OF TRANSFERRIN: CPT | Performed by: INTERNAL MEDICINE

## 2025-03-06 PROCEDURE — 84100 ASSAY OF PHOSPHORUS: CPT | Performed by: STUDENT IN AN ORGANIZED HEALTH CARE EDUCATION/TRAINING PROGRAM

## 2025-03-06 PROCEDURE — 82570 ASSAY OF URINE CREATININE: CPT | Performed by: INTERNAL MEDICINE

## 2025-03-06 PROCEDURE — 83540 ASSAY OF IRON: CPT | Performed by: INTERNAL MEDICINE

## 2025-03-06 PROCEDURE — 80048 BASIC METABOLIC PNL TOTAL CA: CPT | Performed by: STUDENT IN AN ORGANIZED HEALTH CARE EDUCATION/TRAINING PROGRAM

## 2025-03-06 PROCEDURE — 83880 ASSAY OF NATRIURETIC PEPTIDE: CPT | Performed by: STUDENT IN AN ORGANIZED HEALTH CARE EDUCATION/TRAINING PROGRAM

## 2025-03-06 PROCEDURE — 84300 ASSAY OF URINE SODIUM: CPT | Performed by: STUDENT IN AN ORGANIZED HEALTH CARE EDUCATION/TRAINING PROGRAM

## 2025-03-06 PROCEDURE — 97161 PT EVAL LOW COMPLEX 20 MIN: CPT

## 2025-03-06 PROCEDURE — 83935 ASSAY OF URINE OSMOLALITY: CPT | Performed by: STUDENT IN AN ORGANIZED HEALTH CARE EDUCATION/TRAINING PROGRAM

## 2025-03-06 PROCEDURE — 93010 ELECTROCARDIOGRAM REPORT: CPT | Performed by: INTERNAL MEDICINE

## 2025-03-06 PROCEDURE — 25010000002 AMIODARONE IN DEXTROSE 5% 360-4.14 MG/200ML-% SOLUTION: Performed by: INTERNAL MEDICINE

## 2025-03-06 PROCEDURE — 81003 URINALYSIS AUTO W/O SCOPE: CPT | Performed by: STUDENT IN AN ORGANIZED HEALTH CARE EDUCATION/TRAINING PROGRAM

## 2025-03-06 PROCEDURE — 85025 COMPLETE CBC W/AUTO DIFF WBC: CPT | Performed by: STUDENT IN AN ORGANIZED HEALTH CARE EDUCATION/TRAINING PROGRAM

## 2025-03-06 PROCEDURE — 99239 HOSP IP/OBS DSCHRG MGMT >30: CPT | Performed by: STUDENT IN AN ORGANIZED HEALTH CARE EDUCATION/TRAINING PROGRAM

## 2025-03-06 PROCEDURE — 84156 ASSAY OF PROTEIN URINE: CPT | Performed by: INTERNAL MEDICINE

## 2025-03-06 PROCEDURE — 93005 ELECTROCARDIOGRAM TRACING: CPT | Performed by: INTERNAL MEDICINE

## 2025-03-06 PROCEDURE — 99232 SBSQ HOSP IP/OBS MODERATE 35: CPT | Performed by: INTERNAL MEDICINE

## 2025-03-06 RX ORDER — AMIODARONE HYDROCHLORIDE 200 MG/1
200 TABLET ORAL EVERY 12 HOURS
Qty: 90 TABLET | Refills: 3 | Status: SHIPPED | OUTPATIENT
Start: 2025-03-16

## 2025-03-06 RX ORDER — ACETAMINOPHEN 500 MG
1000 TABLET ORAL EVERY 6 HOURS PRN
Status: DISCONTINUED | OUTPATIENT
Start: 2025-03-06 | End: 2025-03-06 | Stop reason: HOSPADM

## 2025-03-06 RX ORDER — BUMETANIDE 0.25 MG/ML
2 INJECTION, SOLUTION INTRAMUSCULAR; INTRAVENOUS DAILY
Status: DISCONTINUED | OUTPATIENT
Start: 2025-03-06 | End: 2025-03-06

## 2025-03-06 RX ORDER — POTASSIUM CHLORIDE 750 MG/1
40 CAPSULE, EXTENDED RELEASE ORAL ONCE
Status: COMPLETED | OUTPATIENT
Start: 2025-03-06 | End: 2025-03-06

## 2025-03-06 RX ADMIN — POTASSIUM CHLORIDE 40 MEQ: 750 CAPSULE, EXTENDED RELEASE ORAL at 09:14

## 2025-03-06 RX ADMIN — AMIODARONE HYDROCHLORIDE 200 MG: 200 TABLET ORAL at 14:26

## 2025-03-06 RX ADMIN — ACETAMINOPHEN 1000 MG: 500 TABLET ORAL at 02:50

## 2025-03-06 RX ADMIN — Medication 10 ML: at 08:26

## 2025-03-06 RX ADMIN — AMIODARONE HYDROCHLORIDE 0.5 MG/MIN: 1.8 INJECTION, SOLUTION INTRAVENOUS at 08:25

## 2025-03-06 RX ADMIN — APIXABAN 2.5 MG: 2.5 TABLET, FILM COATED ORAL at 08:26

## 2025-03-06 NOTE — CONSULTS
"  [facility]   Consult Note    Patient Name: Rubin Ratliff  : 1940  MRN: [id number]  Primary Care Physician:  Asif Chavez MD  Referring Physician: No ref. provider found  Date of admission: 3/4/2025    Subjective   Subjective     Reason for Consult/ Chief Complaint:  hyponatremia and chronic renal failure    HPI:  Rubin Ratliff is a 84 y.o. male  with past medical history significant for dilated cardiomyopathy with ejection fraction of 25% status post defibrillator in , COPD, chronic systolic congestive heart failure, essential hypertension, GERD, new onset of chronic atrial fibrillation was admitted through the emergency room because of chest pressure and worsening shortness of breath.  Patient was found to have A-fib with RVR and patient's heart rate is controlled and I was requested to see the patient because of persistent hyponatremia and acute on chronic kidney disease stage IV.  Patient's baseline creatinine is around 2.    Review of Systems  All review of systems negative except as given below.    Personal History     Past Medical History:   Diagnosis Date    Asthma     Cardiomyopathy     CHF (congestive heart failure)     COPD (chronic obstructive pulmonary disease)     ESRD (end stage renal disease)     STAGE 3 B    GERD (gastroesophageal reflux disease)     Hypertension     ICD (implantable cardioverter-defibrillator) in place  \" NOT MRI COMPATIBLE\"  E142        Past Surgical History:   Procedure Laterality Date    CARDIAC CATHETERIZATION      HERNIA REPAIR      IHR    ICD GENERATOR REPLACEMENT N/A 2024    Procedure: Defibrillator Generator Change - DC -[employer];  Surgeon: JAMES Greer MD;  [facility] INVAS[facility] LOC[facility];  Service: Cardiovascular;  Laterality: N/A;    INSERT / REPLACE / REMOVE PACEMAKER      PLACED     LUMBAR DISCECTOMY      W/ FUSION    SINUS SURGERY      TOTAL HIP ARTHROPLASTY REVISION         Family History: family history " includes Heart disease in his father; Heart failure in his father. Otherwise pertinent FHx was reviewed and not pertinent to current issue.    Social History:  reports that he has never smoked. He has never used smokeless tobacco. He reports that he does not drink alcohol and does not use drugs.    Home Medications:  Fluticasone-Umeclidin-Vilant, albuterol sulfate HFA, allopurinol, amiodarone, apixaban, bumetanide, denosumab, enalapril, fluticasone, metOLazone, metoprolol succinate XL, omeprazole, spironolactone, and tamsulosin    Allergies:  Allergies   Allergen Reactions    Penicillins Hives       Objective    Objective     Vitals:   Temp:  [97.5 °F (36.4 °C)-98.2 °F (36.8 °C)] 97.7 °F (36.5 °C)  Heart Rate:  [] 86  Resp:  [14-16] 16  BP: ()/(48-75) 107/70    Physical Exam:             Constitutional:         Awake, alert responsive, conversant, no obvious distress   Eyes:                       PERRLA, sclerae anicteric, no conjunctival injection   HEENT:                   Moist mucous membranes, no nasal or eye discharge, no throat congestion   Neck:                      Supple, no thyromegaly, no lymphadenopathy, trachea midline, no elevated JVD   Respiratory:           Clear to auscultation bilaterally, nonlabored respirations    Cardiovascular:     RRR, no murmurs, rubs, or gallops, palpable pedal pulses bilaterally, No bilateral ankle edema   Gastrointestinal:   Positive bowel sounds, soft, nontender, non-distended, no organomegaly   Musculoskeletal:  No clubbing or cyanosis to extremities, muscle wasting, joint swelling, muscle weakness   Psychiatric:              Appropriate affect, cooperative   Neurologic:            Awake alert, oriented x 3, strength symmetric in all extremities, Cranial Nerves grossly intact to confrontation, speech clear   Skin:                      No rashes, bruising, skin ulcers, petechiae or ecchymosis    Result Review    Result Review:  I have personally reviewed the  results from the time of this admission to 3/6/2025 15:56 EST and agree with these findings:  []  Laboratory  []  Microbiology  []  Radiology  []  EKG/Telemetry   []  Cardiology/Vascular   []  Pathology  []  Old records  []  Other:    Results from last 7 days   Lab Units 03/06/25  0430 03/05/25 0457 03/04/25  1546   WBC 10*3/mm3 12.62* 12.50* 12.93*   HEMOGLOBIN g/dL 10.6* 11.1* 11.7*   PLATELETS 10*3/mm3 190 198 225     Results from last 7 days   Lab Units 03/06/25  0430 03/05/25 0457 03/04/25  1546   SODIUM mmol/L 126* 127* 128*   POTASSIUM mmol/L 3.8 3.9 4.2   CHLORIDE mmol/L 88* 89* 87*   CO2 mmol/L 21.8* 21.9* 25.1   ANION GAP mmol/L 16.2* 16.1* 15.9*   BUN mg/dL 85* 82* 79*   CREATININE mg/dL 2.95* 2.51* 2.59*   GLUCOSE mg/dL 137* 125* 130*   EGFR mL/min/1.73 20.0* 24.3* 23.4*   CALCIUM mg/dL 9.0 9.2 9.5   MAGNESIUM mg/dL 2.4 1.8 1.2*   ALBUMIN g/dL  --   --  4.3   BILIRUBIN mg/dL  --   --  0.9   ALK PHOS U/L  --   --  60   ALT (SGPT) U/L  --   --  7   AST (SGOT) U/L  --   --  18       Assessment & Plan   Assessment / Plan     Active Hospital Problems:  Active Hospital Problems    Diagnosis     **Atrial fibrillation with rapid ventricular response     Acute renal failure superimposed on chronic kidney disease     History of anemia due to chronic kidney disease     Hyponatremia     Chronic systolic congestive heart failure, NYHA class 3        Plan:   I will hold any antihypertensive medications and diuretics for now   Check iron profile   Renal ultrasound   Daily p.o. fluid restriction 1200 mL    Electronically signed by Azalia Rowe MD, 03/06/25, 3:49 PM EST.

## 2025-03-06 NOTE — DISCHARGE SUMMARY
[facility]         HOSPITALIST  DISCHARGE SUMMARY    Patient Name: Rubin Ratliff  : 1940  MRN: [id number]    Date of Admission: 3/4/2025  Date of Discharge:  3/6/2025  Primary Care Physician: Asif Chavez MD    Consults       Date and Time Order Name Status Description    3/6/2025  9:56 AM Inpatient Nephrology Consult      3/4/2025  6:18 PM Cardiology (on-call MD unless specified) Completed     3/4/2025  5:59 PM Hospitalist (on-call MD unless specified)              Active and Resolved Hospital Problems:  Active Hospital Problems    Diagnosis POA    **Atrial fibrillation with rapid ventricular response [I48.91] Yes    Acute renal failure superimposed on chronic kidney disease [N17.9, N18.9] Unknown    History of anemia due to chronic kidney disease [N18.9, Z86.2] Not Applicable    Hyponatremia [E87.1] Unknown    Chronic systolic congestive heart failure, NYHA class 3 [I50.22] Unknown      Resolved Hospital Problems   No resolved problems to display.       Hospital Course     Hospital Course:  Rubin Ratliff is a 84 y.o. male with past medical history of hypertension, COPD, CHF with reduced ejection fraction of 25%, dilated cardiomyopathy, CKD 3B, status post AICD with past medical, COPD, and GERD was sent to the ED by cardiology for management of new onset A-fib and CIPRIANO.  Patient states that for the last week or so he has been having dyspnea on exertion with intermittent palpitations.  Patient went to PCP a few days ago where he had his Bumex increased to twice daily which improved his breathing somewhat but then became lightheaded upon standing and worsening palpitations.  Patient then went to PCPs office where cardiology was there as well and was found to be hypotensive with EKG showing new onset A-fib with RVR so he was sent to the ED for further management.  In the ED patient was tachycardic with soft blood pressures on arrival.  EKG showed A-fib RVR, with left bundle  branch block.  Labs showed mild leukocytosis and anemia with hypomagnesemia and elevated proBNP.  Chest x-ray showed cardiomegaly and mild pulmonary vascular congestion and pulmonary edema.  When seen patient was resting comfortably when asked she denied any recent fevers, chills, headaches, focal weakness, abdominal pain, nausea, vomiting, diarrhea, constipation, dysuria, hematuria, hematochezia, melena, or anxiety.  Patient admitted for further evaluation and treatment.  Cardiology on board.  Patient was started on amiodarone drip.  Initially plan for CIPRIANO cardioversion but patient converted to sinus rhythm after which the procedure was held.  Completed the loading dose of amiodarone and heart rate has been stable.  Started on p.o. amiodarone.  Dr. Greer from cardiology followed the patient, he started on oral Bumex.  Also started on p.o. amiodarone to maintain sinus rhythm.  Enalapril, metolazone and spironolactone discontinued.  Cardiologist discussed additional option to prograde his ICD to biventricular device.  Nephrology consulted for VENKATESH and CKD.  Patient prefers nephrology follow-up as outpatient.  Okay to be discharged from cardiology and nephrology standpoint today.    Patient is being discharged home today.  He stable at the time of discharge.  He will follow-up with PCP in 2 days, needs CBC and chemistries trended during follow-up.  Follow-up with cardiology and nephrology as outpatient.  Patient to follow-up with Dr. Rowe at [facility] next week.  Advised to be compliant with medications and diet.  Fall precautions.        DISCHARGE Follow Up Recommendations for labs and diagnostics: As mentioned above.      Day of Discharge     Vital Signs:  Temp:  [97.5 °F (36.4 °C)-98.2 °F (36.8 °C)] 97.5 °F (36.4 °C)  Heart Rate:  [] 97  Resp:  [14-16] 16  BP: ()/(48-75) 111/75  Physical Exam:   General: Awake, alert, NAD  Cardiovascular: RRR, no murmurs   Pulmonary: CTA bilaterally; no  wheezes; no conversational dyspnea  Gastrointestinal: S/ND/NT, +BS  Neuro: Alert, awake, oriented x 3; speech clear; no tremor     Discharge Details        Discharge Medications        New Medications        Instructions Start Date   amiodarone 200 MG tablet  Commonly known as: PACERONE   200 mg, Oral, Every 12 Hours, Take 1 pill twice a day for 14 days then decrease to 1 pill daily   Start Date: March 16, 2025     apixaban 2.5 MG tablet tablet  Commonly known as: ELIQUIS   2.5 mg, Oral, Every 12 Hours Scheduled             Continue These Medications        Instructions Start Date   albuterol sulfate  (90 Base) MCG/ACT inhaler  Commonly known as: PROVENTIL HFA;VENTOLIN HFA;PROAIR HFA   2 puffs, Every 4 Hours PRN      allopurinol 100 MG tablet  Commonly known as: ZYLOPRIM   100 mg, 2 Times Daily      bumetanide 2 MG tablet  Commonly known as: BUMEX   2 mg, Daily      denosumab 60 MG/ML solution prefilled syringe syringe  Commonly known as: PROLIA   60 mg      fluticasone 50 MCG/ACT nasal spray  Commonly known as: FLONASE   2 sprays, Daily PRN      metoprolol succinate XL 25 MG 24 hr tablet  Commonly known as: TOPROL-XL   25 mg, Daily      tamsulosin 0.4 MG capsule 24 hr capsule  Commonly known as: FLOMAX   1 capsule, Every Evening      Trelegy Ellipta 200-62.5-25 MCG/ACT inhaler  Generic drug: Fluticasone-Umeclidin-Vilant   1 puff, Daily - RT             Stop These Medications      enalapril 2.5 MG tablet  Commonly known as: VASOTEC     metOLazone 2.5 MG tablet  Commonly known as: ZAROXOLYN     omeprazole 20 MG capsule  Commonly known as: priLOSEC     spironolactone 25 MG tablet  Commonly known as: ALDACTONE              Allergies   Allergen Reactions    Penicillins Hives       Discharge Disposition:  Home-Health Care [facility]    Diet:  Hospital:  Diet Order   Procedures    Diet: Cardiac, Fluid Restriction (240 mL/tray); Healthy Heart (2-3 Na+); 1500 mL/day; Fluid Consistency: Thin (IDDSI 0)       Discharge  Activity:       CODE STATUS:  Code Status and Medical Interventions: CPR (Attempt to Resuscitate); Full Support   Ordered at: 03/04/25 2047     Level Of Support Discussed With:    Patient     Code Status (Patient has no pulse and is not breathing):    CPR (Attempt to Resuscitate)     Medical Interventions (Patient has pulse or is breathing):    Full Support       Future Appointments   Date Time Provider Department Center   3/18/2025  8:45 AM JAMES Greer [place] [facility]       Additional Instructions for the Follow-ups that You Need to Schedule       Discharge Follow-up with PCP   As directed       Currently Documented PCP:    Asif Chavez MD    PCP Phone Number:    [phone]     Follow Up Details: In 2 days; needs CBC and chemistries trended during follow up.        Discharge Follow-up with Specified Provider: Cardiology ()   As directed      To: Cardiology ()   Follow Up Details: As scheduled        Basic Metabolic Panel    Mar 13, 2025 (Approximate)      Release to patient: Routine Release                Pertinent  and/or Most Recent Results     PROCEDURES:       LAB RESULTS:      Lab 03/06/25 0430 03/05/25 1958 03/05/25 1213 03/05/25 0457 03/04/25 2039 03/04/25  1546   WBC 12.62*  --   --  12.50*  --  12.93*   HEMOGLOBIN 10.6*  --   --  11.1*  --  11.7*   HEMATOCRIT 32.1*  --   --  32.8*  --  35.6*   PLATELETS 190  --   --  198  --  225   NEUTROS ABS 10.55*  --   --   --   --  10.66*   IMMATURE GRANS (ABS) 0.09*  --   --   --   --  0.08*   LYMPHS ABS 0.85  --   --   --   --  1.20   MONOS ABS 0.99*  --   --   --   --  0.85   EOS ABS 0.09  --   --   --   --  0.10   MCV 94.1  --   --  94.0  --  94.7   PROTIME  --   --   --   --   --  14.1   APTT  --  55.5* 99.6* 39.9* 27.8*  --          Lab 03/06/25 0430 03/05/25 0457 03/04/25  1546   SODIUM 126* 127* 128*   POTASSIUM 3.8 3.9 4.2   CHLORIDE 88* 89* 87*   CO2 21.8* 21.9* 25.1   ANION GAP 16.2* 16.1* 15.9*   BUN 85* 82* 79*    CREATININE 2.95* 2.51* 2.59*   EGFR 20.0* 24.3* 23.4*   GLUCOSE 137* 125* 130*   CALCIUM 9.0 9.2 9.5   MAGNESIUM 2.4 1.8 1.2*   PHOSPHORUS 3.6  --   --    TSH  --   --  2.990         Lab 03/04/25  1546   TOTAL PROTEIN 8.1   ALBUMIN 4.3   GLOBULIN 3.8   ALT (SGPT) 7   AST (SGOT) 18   BILIRUBIN 0.9   ALK PHOS 60         Lab 03/06/25  0430 03/04/25  1712 03/04/25  1546   PROBNP 33,100.0* 32,061.0*  --    HSTROP T  --  50* 52*   PROTIME  --   --  14.1   INR  --   --  1.04             Lab 03/06/25  0430   IRON 27*   IRON SATURATION (TSAT) 11*   TIBC 255*   TRANSFERRIN 171*         Brief Urine Lab Results  (Last result in the past 365 days)        Color   Clarity   Blood   Leuk Est   Nitrite   Protein   CREAT   Urine HCG        03/06/25 1532             124.3         03/06/25 1532 Yellow   Clear   Negative   Negative   Negative   Trace                 Microbiology Results (last 10 days)       ** No results found for the last 240 hours. **            XR Chest 1 View    Result Date: 3/4/2025  Impression: Cardiomegaly and mild pulm vascular congestion, pulmonary edema. There is a small right-sided pleural effusion. Electronically Signed: Derrick Selby MD  3/4/2025 4:24 PM EST  Workstation ID: [id number]                  Labs Pending at Discharge:  Pending Labs       Order Current Status    Sodium, Urine, Random - Urine, Clean Catch In process              Time spent on Discharge including face to face service:  35 minutes    Electronically signed by Mansoor Chan MD, 03/06/25, 4:59 PM EST.

## 2025-03-06 NOTE — THERAPY EVALUATION
"Acute Care - Physical Therapy Initial Evaluation   [place]     Patient Name: Rubin Ratliff  : 1940  MRN: [id number]  Today's Date: 3/6/2025      Visit Dx:     ICD-10-CM ICD-9-CM   1. Atrial fibrillation with rapid ventricular response  I48.91 427.31   2. Difficulty in walking  R26.2 719.7     Patient Active Problem List   Diagnosis    ICD (implantable cardioverter-defibrillator) battery depletion    Atrial fibrillation with rapid ventricular response    Acute on chronic HFrEF (heart failure with reduced ejection fraction)     Past Medical History:   Diagnosis Date    Asthma     Cardiomyopathy     CHF (congestive heart failure)     COPD (chronic obstructive pulmonary disease)     ESRD (end stage renal disease)     STAGE 3 B    GERD (gastroesophageal reflux disease)     Hypertension     ICD (implantable cardioverter-defibrillator) in place  \" NOT MRI COMPATIBLE\"  E142      Past Surgical History:   Procedure Laterality Date    CARDIAC CATHETERIZATION      HERNIA REPAIR      IHR    ICD GENERATOR REPLACEMENT N/A 2024    Procedure: Defibrillator Generator Change - DC [employer];  Surgeon: JAMES Greer MD;  Location: [facility] INVASIVE LOCATION;  Service: Cardiovascular;  Laterality: N/A;    INSERT / REPLACE / REMOVE PACEMAKER      PLACED     LUMBAR DISCECTOMY      W/ FUSION    SINUS SURGERY      TOTAL HIP ARTHROPLASTY REVISION       PT Assessment (Last 12 Hours)       PT Evaluation and Treatment       Row Name 25 0800          Physical Therapy Time and Intention    Subjective Information complains of;fatigue (P)   -TM     Document Type evaluation (P)   -TM     Mode of Treatment individual therapy (P)   -TM     Patient Effort good (P)   -TM       Row Name 25 0800          General Information    Prior Level of Function independent:;all household mobility;ADL's (P)   -TM     Equipment Currently Used at Home cane, quad (P)   -TM     Existing Precautions/Restrictions fall (P)   -TM "       [facility] Name 03/06/25 0800          Living Environment    Current Living Arrangements home (P)   -     People in Home alone (P)   -TM     Primary Care Provided by self (P)   -[facility] Name 03/06/25 0800          Home Use of Assistive/Adaptive Equipment    Equipment Currently Used at Home cane, quad;walker, rolling (P)   -[facility] Name 03/06/25 0800          Range of Motion (ROM)    Range of Motion ROM is WNL;bilateral lower extremities (P)   -[facility] Name 03/06/25 0800          Strength (Manual Muscle Testing)    Strength (Manual Muscle Testing) strength is WNL;bilateral lower extremities (P)   -[facility] Name 03/06/25 0800          Bed Mobility    Bed Mobility bed mobility (all) activities (P)   -     All Activities, [place] (Bed Mobility) contact guard (P)   -[facility] Name 03/06/25 0800          Transfers    Transfers sit-stand transfer (P)   -[facility] Name 03/06/25 0800          Sit-Stand Transfer    Sit-Stand [place] (Transfers) standby assist (P)   -     Assistive Device (Sit-Stand Transfers) walker, front-wheeled (P)   -[facility] Name 03/06/25 0800          Gait/Stairs (Locomotion)    Gait/Stairs Locomotion gait/ambulation independence;gait/ambulation assistive device (P)   -     [place] Level (Gait) contact guard (P)   -     Assistive Device (Gait) walker, front-wheeled (P)   -     Patient was able to Ambulate yes (P)   -     Distance in Feet (Gait) 150 (P)   -[facility] Name 03/06/25 0800          Safety Issues/Impairments Affecting Functional Mobility    Impairments Affecting Function (Mobility) balance;endurance/activity tolerance (P)   -[facility] Name 03/06/25 0800          Balance    Balance Assessment standing dynamic balance (P)   -     Dynamic Standing Balance contact guard (P)   -     Position/Device Used, Standing Balance walker, front-wheeled (P)   -       Row Name 03/06/25 0800          Plan of Care Review    Plan of Care  Reviewed With patient (P)   -TM     Outcome Evaluation Pt presents with balance and endurance deficits and is unable to safely ambualte without assistance. Pt requires skilled therapy services (P)   -TM       Row Name 03/06/25 0800          Therapy Assessment/Plan (PT)    Patient/Family Therapy Goals Statement (PT) Return home (P)   -TM     Rehab Potential (PT) good (P)   -TM     Criteria for Skilled Interventions Met (PT) no problems identified which require skilled intervention (P)   -TM     Therapy Frequency (PT) evaluation only (P)   -TM       Row Name 03/06/25 0800          PT Evaluation Complexity    History, PT Evaluation Complexity no personal factors and/or comorbidities (P)   -TM     Examination of Body Systems (PT Eval Complexity) total of 4 or more elements (P)   -TM     Clinical Presentation (PT Evaluation Complexity) stable (P)   -TM     Clinical Decision Making (PT Evaluation Complexity) low complexity (P)   -TM     Overall Complexity (PT Evaluation Complexity) low complexity (P)   -TM       Row Name 03/06/25 0800          Therapy Plan Review/Discharge Plan (PT)    Therapy Plan Review (PT) evaluation/treatment results reviewed;patient (P)   -TM               User Key  (r) = Recorded By, (t) = Taken By, (c) = Cosigned By      Initials Name Provider Type    TM Milton Sandhu, PT Student PT Student                    Physical Therapy Education       Title: PT OT SLP Therapies (Done)       Topic: Physical Therapy (Done)       Point: Mobility training (Done)       Learning Progress Summary            Patient Acceptance, E,TB, VU by TM at 3/6/2025 0849                      Point: Home exercise program (Done)       Learning Progress Summary            Patient Acceptance, E,TB, VU by TM at 3/6/2025 0849                      Point: Body mechanics (Done)       Learning Progress Summary            Patient Acceptance, E,TB, VU by TM at 3/6/2025 0849                      Point: Precautions (Done)       Learning  Progress Summary            Patient Acceptance, E,TB, VU by TM at 3/6/2025 0849                                      User Key       Initials Effective Dates Name Provider Type Discipline    TM 02/04/25 -  Milton Sandhu, PT Student PT Student PT                  PT Recommendation and Plan  Anticipated Discharge Disposition (PT): (P) home with home health  Therapy Frequency (PT): (P) evaluation only  Plan of Care Reviewed With: (P) patient  Outcome Evaluation: (P) Pt presents with balance and endurance deficits and is unable to safely ambualte without assistance. Pt requires skilled therapy services   Outcome Measures       Row Name 03/06/25 0800             How much help from another person do you currently need...    Turning from your back to your side while in flat bed without using bedrails? 4 (P)   -TM      Moving from lying on back to sitting on the side of a flat bed without bedrails? 3 (P)   -TM      Moving to and from a bed to a chair (including a wheelchair)? 3 (P)   -TM      Standing up from a chair using your arms (e.g., wheelchair, bedside chair)? 4 (P)   -TM      Climbing 3-5 steps with a railing? 3 (P)   -TM      To walk in hospital room? 3 (P)   -TM      AM-PAC 6 Clicks Score (PT) 20 (P)   -TM         Functional Assessment    Outcome Measure Options AM-PAC 6 Clicks Basic Mobility (PT) (P)   -TM                User Key  (r) = Recorded By, (t) = Taken By, (c) = Cosigned By      Initials Name Provider Type    TM Milton Sandhu, PT Student PT Student                     Time Calculation:    PT Charges       Row Name 03/06/25 0843             Time Calculation    PT Received On 03/06/25 (P)   -TM      PT Goal Re-Cert Due Date 03/15/25 (P)   -TM         Untimed Charges    PT Eval/Re-eval Minutes 25 (P)   -TM         Total Minutes    Untimed Charges Total Minutes 25 (P)   -TM       Total Minutes 25 (P)   -TM                User Key  (r) = Recorded By, (t) = Taken By, (c) = Cosigned By      Initials Name Provider  Type    TM Milton Sandhu, PT Student PT Student                  Therapy Charges for Today       Code Description Service Date Service Provider Modifiers Qty    01480280133 HC PT EVAL LOW COMPLEXITY 2 3/6/2025 Milton Sandhu, PT Student GP 1            PT G-Codes  Outcome Measure Options: (P) AM-PAC 6 Clicks Basic Mobility (PT)  AM-PAC 6 Clicks Score (PT): (P) 20    Milton Sandhu PT Student  3/6/2025

## 2025-03-06 NOTE — PROGRESS NOTES
CARDIOLOGY  INPATIENT PROGRESS NOTE         [facility] CARE UNIT    3/6/2025      PATIENT IDENTIFICATION:   Name:  Rubin Ratliff      MRN:  [id number]     84 y.o.  male             Reason for visit: Acute on chronic systolic heart failure, atrial flutter with RVR      SUBJECTIVE:    The patient is maintaining sinus rhythm on amiodarone.  Device interrogation shows he has had several episodes of probably atrial tachycardia with rapid ventricular rate.  No definite atrial fibrillation was seen there.  He has a very long first-degree AV block.  He is feeling better.  He has ambulated.  He denies dizziness.  He is much less short of breath and walked down the hdez without significant difficulty.  No orthopnea.  OBJECTIVE:  Vitals:    03/06/25 0300 03/06/25 0400 03/06/25 0500 03/06/25 0800   BP: 106/52 92/48 98/51 95/61   BP Location:    Right arm   Patient Position:    Lying   Pulse: 102 82 83 79   Resp:    16   Temp:    97.5 °F (36.4 °C)   TempSrc:    Oral   SpO2:    96%   Weight:       Height:               Body mass index is 20.72 kg/m².    Intake/Output Summary (Last 24 hours) at 3/6/2025 1156  Last data filed at 3/6/2025 1000  Gross per 24 hour   Intake 360 ml   Output 100 ml   Net 260 ml       Telemetry: Sinus rhythm with long first-degree AV block, left bundle branch block    Review of Systems   Respiratory:  Positive for shortness of breath.    Cardiovascular:  Negative for chest pain, palpitations and leg swelling.         Exam:  General appearance no acute distress    well nourished   HEENT sclerae non-icteric    lips not cyanotic   Respiratory rate and depth normal    normal breath sounds    no rales, no wheeze   Cardiovascular JVP normal    regular rhythm    S1 normal, S2 normal    no S3, no S4    no murmur    lower extremity edema:none   Abdominal bowel sounds normal    abdomen soft, non-tender    no hepatosplenomegaly   Neuro-psych oriented to person, place, time    cooperative  "    Allergies   Allergen Reactions    Penicillins Hives     Scheduled meds:  amiodarone, 200 mg, Oral, Once   Followed by  amiodarone, 200 mg, Oral, Q12H   Followed by  [START ON 3/16/2025] amiodarone, 200 mg, Oral, Q12H   Followed by  [START ON 3/31/2025] amiodarone, 200 mg, Oral, Daily  apixaban, 2.5 mg, Oral, Q12H  sodium chloride, 10 mL, Intravenous, Q12H      IV meds:                      amiodarone, 0.5 mg/min, Last Rate: 0.5 mg/min (03/06/25 0825)      Data Review:  Results from last 7 days   Lab Units 03/06/25  0430 03/05/25 0457 03/04/25  1546   SODIUM mmol/L 126* 127* 128*   BUN mg/dL 85* 82* 79*   CREATININE mg/dL 2.95* 2.51* 2.59*   GLUCOSE mg/dL 137* 125* 130*             Estimated Creatinine Clearance: 16.7 mL/min (A) (by C-G formula based on SCr of 2.95 mg/dL (H)).  Results from last 7 days   Lab Units 03/06/25  0430 03/05/25 0457 03/04/25  1546   WBC 10*3/mm3 12.62* 12.50* 12.93*   HEMOGLOBIN g/dL 10.6* 11.1* 11.7*     Results from last 7 days   Lab Units 03/04/25  1546   INR  1.04     Results from last 7 days   Lab Units 03/04/25  1546   ALT (SGPT) U/L 7   AST (SGOT) U/L 18     No results found for: \"DIGOXIN\"   Lab Results   Component Value Date    TSH 2.990 03/04/2025           Invalid input(s): \"LDLCALC\"            Imaging (last 24 hr):   Imaging Results (Last 24 Hours)       ** No results found for the last 24 hours. **              ASSESSMENT:     Atrial fibrillation with rapid ventricular response    Acute on chronic HFrEF (heart failure with reduced ejection fraction)        PLAN:  1.  Lengthy discussion with the patient today.  Unfortunately due to the severity of his renal dysfunction he is not a candidate for most of our guideline therapies for heart failure.  We have tried over the years to get him on bare bones levels of these medications and titrate but his low blood pressure has prevented that..  Due to creatinine of 2.9 at this point he is no longer a candidate for ACE inhibitor/Arni " therapy and based on the studies proving benefit of Aldactone his renal function is outside the range that would have been included in those studies.  So we will resume his oral Bumex.  No further metolazone will be given.  Will change to oral amiodarone loading to maintain sinus rhythm.  Stay off of Aldactone and enalapril.  2.  I discussed that 1 additional option is to upgrade his ICD to a biventricular device.  He has a very long first-degree AV block and wide left bundle branch block so he definitely has both AV and V-V dyssynchrony.  I discussed with him and his family there is about a 70% or so chance that with upgrade of his ICD to a CRT system he will have symptomatic benefit with this.  He is going to give that some thought but for now we will continue to allow oral amiodarone loading, early office follow-up in the next couple of weeks and then he will decide if he wants to proceed with that.  3.  Okay to discharge today, repeat BMP 1 week              Devante Greer MD  3/6/2025    11:56 EST

## 2025-03-07 LAB — SODIUM UR-SCNC: 43 MMOL/L

## 2025-03-07 NOTE — OUTREACH NOTE
Prep Survey      Flowsheet Row Responses   [facility]? [facility]   Is LACE score < 7 ? No   Eligibility Readm Mgmt   Discharge diagnosis Atrial fibrillation with rapid ventricular response  Principal problem   Does the patient have one of the following disease processes/diagnoses(primary or secondary)? Other   Does the patient have Home health ordered? Yes   What is the Home health agency?  [facility]   Is there a DME ordered? No   Prep survey completed? Yes            RAYNA BELL - Registered Nurse

## 2025-03-10 ENCOUNTER — TELEPHONE (OUTPATIENT)
Dept: CARDIOLOGY | Facility: CLINIC | Age: 85
End: 2025-03-10

## 2025-03-10 NOTE — TELEPHONE ENCOUNTER
Caller: RONAN JESSICA    Relationship: Emergency Contact    Best call back number: [phone]    What orders are you requesting (i.e. lab or imaging): LAB ORDERS    In what timeframe would the patient need to come in: ASAP  HAS AN APPT 3-11-25 AT  9:15 HAVING LABS DONE THEN    Where will you receive your lab/imaging services: DR. ZACHARY KRAUSE    Additional notes: FAX NUMBER [phone]      WILL THAT BE BMP OR CMP?     SHE IS NOT SURE WHICH LABS PCP WILL BE DOING BUT WANTS TO HAVE THEM DONE AT THE SAME TIME

## 2025-03-11 ENCOUNTER — READMISSION MANAGEMENT (OUTPATIENT)
Dept: CALL CENTER | Facility: HOSPITAL | Age: 85
End: 2025-03-11
Payer: MEDICARE

## 2025-03-11 NOTE — OUTREACH NOTE
Medical Week 1 Survey      Flowsheet Row Responses   [facility] patient discharged from? [facility]   Does the patient have one of the following disease processes/diagnoses(primary or secondary)? Other   Week 1 attempt successful? Yes   Call start time 1405   Call end time 1415   Discharge diagnosis Atrial fibrillation with rapid ventricular response   Meds reviewed with patient/caregiver? Yes   Is the patient having any side effects they believe may be caused by any medication additions or changes? Yes   Side effects comments  pt had some nausea after starting the Amiodarone BID, he reduced to once daily which helped nausea. PCP advised pt to try twice daily again but take with a full stomach to avoid nausea, pt reports that he will resume BID.   Does the patient have all medications ordered at discharge? Yes   Is the patient taking all medications as directed (includes completed medication regime)? Yes   What is the Home health agency?  [facility] [place]   Has home health visited the patient within 72 hours of discharge? Yes   Comments Pt reports his SOA has improved since DC, but still remains, CXR showed fluid retention at PCP f/u appt today.Pt denies any visible edema. Palpitations are resolved at this time per pt report. Pt states that he monitors his weight, BP/HR daily, Patient reports that he has not had dizziness or syncope at home.   Did the patient receive a copy of their discharge instructions? Yes   Nursing interventions Reviewed instructions with patient   What is the patient's perception of their health status since discharge? Improving   Is the patient/caregiver able to teach back signs and symptoms related to disease process for when to call PCP? Yes   Is the patient/caregiver able to teach back signs and symptoms related to disease process for when to call 911? Yes   Is the patient/caregiver able to teach back the hierarchy of who to call/visit for symptoms/problems? PCP, Specialist, Home health nurse,  Urgent Care, ED, 911 Yes   Week 1 call completed? Yes   Revoked No further contact(revokes)-requires comment   Call end time [id number]            Herminia SANTOS - Registered Nurse

## 2025-03-18 ENCOUNTER — OFFICE VISIT (OUTPATIENT)
Dept: CARDIOLOGY | Facility: CLINIC | Age: 85
End: 2025-03-18
Payer: MEDICARE

## 2025-03-18 VITALS
HEART RATE: 98 BPM | HEIGHT: 70 IN | BODY MASS INDEX: 20.04 KG/M2 | SYSTOLIC BLOOD PRESSURE: 95 MMHG | DIASTOLIC BLOOD PRESSURE: 65 MMHG | OXYGEN SATURATION: 94 % | WEIGHT: 140 LBS

## 2025-03-18 DIAGNOSIS — I50.22 CHRONIC SYSTOLIC CONGESTIVE HEART FAILURE: ICD-10-CM

## 2025-03-18 DIAGNOSIS — I47.20 VT (VENTRICULAR TACHYCARDIA): ICD-10-CM

## 2025-03-18 DIAGNOSIS — Z95.810 ICD (IMPLANTABLE CARDIOVERTER-DEFIBRILLATOR), DUAL, IN SITU: ICD-10-CM

## 2025-03-18 DIAGNOSIS — I42.0 DILATED CARDIOMYOPATHY: Primary | ICD-10-CM

## 2025-03-18 PROCEDURE — 1160F RVW MEDS BY RX/DR IN RCRD: CPT | Performed by: INTERNAL MEDICINE

## 2025-03-18 PROCEDURE — 99214 OFFICE O/P EST MOD 30 MIN: CPT | Performed by: INTERNAL MEDICINE

## 2025-03-18 PROCEDURE — 1159F MED LIST DOCD IN RCRD: CPT | Performed by: INTERNAL MEDICINE

## 2025-03-18 RX ORDER — POTASSIUM CHLORIDE 750 MG/1
TABLET, EXTENDED RELEASE ORAL
COMMUNITY
Start: 2025-03-12

## 2025-03-21 ENCOUNTER — OUTSIDE FACILITY SERVICE (OUTPATIENT)
Dept: CARDIOLOGY | Facility: CLINIC | Age: 85
End: 2025-03-21
Payer: MEDICARE

## 2025-03-21 PROCEDURE — 93010 ELECTROCARDIOGRAM REPORT: CPT | Performed by: INTERNAL MEDICINE

## 2025-04-26 NOTE — PLAN OF CARE
Goal Outcome Evaluation:                                            
Goal Outcome Evaluation:           Progress: improving  Outcome Evaluation: Pt continues on amioderone drip.  Heparin was dcd.  C/O back pain and was given tylenol.  effective for pain.                             
Goal Outcome Evaluation:         New Admit from ER vitals stable alert and oriented progressing toward goals                                   
Goal Outcome Evaluation:  Plan of Care Reviewed With: (P) patient           Outcome Evaluation: (P) Pt presents with balance and endurance deficits and is unable to safely ambualte without assistance. Pt requires skilled therapy services    Anticipated Discharge Disposition (PT): (P) home with home health                        
Goal Outcome Evaluation:  Plan of Care Reviewed With: patient, family                                         
Co in place and ongoing. will continue to monitor as clinically necessary and medically appropriate

## (undated) DEVICE — PLASMABLADE PS200-040 4.0: Brand: PLASMABLADE™

## (undated) DEVICE — SUT VIC 3/0 SH 27IN J416H

## (undated) DEVICE — SUT VIC 2/0 SH 27IN

## (undated) DEVICE — ADHS SKIN PREMIERPRO EXOFIN TOPICAL HI/VISC .5ML